# Patient Record
Sex: MALE | Race: WHITE | NOT HISPANIC OR LATINO | Employment: FULL TIME | URBAN - METROPOLITAN AREA
[De-identification: names, ages, dates, MRNs, and addresses within clinical notes are randomized per-mention and may not be internally consistent; named-entity substitution may affect disease eponyms.]

---

## 2019-11-04 ENCOUNTER — TELEPHONE (OUTPATIENT)
Dept: FAMILY MEDICINE | Facility: CLINIC | Age: 39
End: 2019-11-04

## 2019-11-04 RX ORDER — VERAPAMIL HYDROCHLORIDE 240 MG/1
240 CAPSULE, EXTENDED RELEASE ORAL DAILY
Qty: 90 CAPSULE | Refills: 1 | Status: SHIPPED | OUTPATIENT
Start: 2019-11-04 | End: 2020-04-09 | Stop reason: SDUPTHER

## 2019-11-04 NOTE — TELEPHONE ENCOUNTER
----- Message from Stormy Martinez sent at 11/4/2019  3:16 PM CST -----  Pt needs a RX  For Verapamil  [address] [facility] in [place]

## 2019-12-03 NOTE — TELEPHONE ENCOUNTER
----- Message from Justina Zepeda sent at 12/3/2019  3:42 PM CST -----  lexapro   Pharm [facility]   Pt [phone]

## 2019-12-04 ENCOUNTER — TELEPHONE (OUTPATIENT)
Dept: FAMILY MEDICINE | Facility: CLINIC | Age: 39
End: 2019-12-04

## 2019-12-04 RX ORDER — ESCITALOPRAM OXALATE 10 MG/1
10 TABLET ORAL DAILY
Qty: 90 TABLET | Refills: 1 | Status: SHIPPED | OUTPATIENT
Start: 2019-12-04 | End: 2020-07-14 | Stop reason: SDUPTHER

## 2019-12-04 NOTE — TELEPHONE ENCOUNTER
----- Message from Justina Zepeda sent at 12/3/2019  3:42 PM CST -----  lexapro   Pharm [facility]   Pt [phone]

## 2020-04-09 RX ORDER — VERAPAMIL HYDROCHLORIDE 240 MG/1
240 CAPSULE, EXTENDED RELEASE ORAL DAILY
Qty: 30 CAPSULE | Refills: 1 | Status: SHIPPED | OUTPATIENT
Start: 2020-04-09 | End: 2020-07-14 | Stop reason: SDUPTHER

## 2020-04-09 NOTE — TELEPHONE ENCOUNTER
----- Message from Justina Zepeda sent at 4/9/2020 11:41 AM CDT -----  Refills on bp med   Pharm [facility]   Pt [phone]

## 2020-06-23 ENCOUNTER — TELEPHONE (OUTPATIENT)
Dept: FAMILY MEDICINE | Facility: CLINIC | Age: 40
End: 2020-06-23

## 2020-07-14 ENCOUNTER — OFFICE VISIT (OUTPATIENT)
Dept: FAMILY MEDICINE | Facility: CLINIC | Age: 40
End: 2020-07-14
Payer: COMMERCIAL

## 2020-07-14 VITALS
SYSTOLIC BLOOD PRESSURE: 138 MMHG | DIASTOLIC BLOOD PRESSURE: 92 MMHG | TEMPERATURE: 98 F | WEIGHT: 199 LBS | HEART RATE: 72 BPM | BODY MASS INDEX: 28.55 KG/M2

## 2020-07-14 DIAGNOSIS — I10 ESSENTIAL HYPERTENSION: Primary | ICD-10-CM

## 2020-07-14 DIAGNOSIS — K21.9 GASTROESOPHAGEAL REFLUX DISEASE, ESOPHAGITIS PRESENCE NOT SPECIFIED: ICD-10-CM

## 2020-07-14 DIAGNOSIS — F41.9 ANXIETY: ICD-10-CM

## 2020-07-14 PROCEDURE — 99396 PREV VISIT EST AGE 40-64: CPT | Mod: S$GLB,,, | Performed by: PHYSICIAN ASSISTANT

## 2020-07-14 PROCEDURE — 99396 PR PREVENTIVE VISIT,EST,40-64: ICD-10-PCS | Mod: S$GLB,,, | Performed by: PHYSICIAN ASSISTANT

## 2020-07-14 PROCEDURE — 3008F BODY MASS INDEX DOCD: CPT | Mod: S$GLB,,, | Performed by: PHYSICIAN ASSISTANT

## 2020-07-14 PROCEDURE — 3008F PR BODY MASS INDEX (BMI) DOCUMENTED: ICD-10-PCS | Mod: S$GLB,,, | Performed by: PHYSICIAN ASSISTANT

## 2020-07-14 PROCEDURE — 3075F PR MOST RECENT SYSTOLIC BLOOD PRESS GE 130-139MM HG: ICD-10-PCS | Mod: S$GLB,,, | Performed by: PHYSICIAN ASSISTANT

## 2020-07-14 PROCEDURE — 3080F DIAST BP >= 90 MM HG: CPT | Mod: S$GLB,,, | Performed by: PHYSICIAN ASSISTANT

## 2020-07-14 PROCEDURE — 3075F SYST BP GE 130 - 139MM HG: CPT | Mod: S$GLB,,, | Performed by: PHYSICIAN ASSISTANT

## 2020-07-14 PROCEDURE — 3080F PR MOST RECENT DIASTOLIC BLOOD PRESSURE >= 90 MM HG: ICD-10-PCS | Mod: S$GLB,,, | Performed by: PHYSICIAN ASSISTANT

## 2020-07-14 RX ORDER — VERAPAMIL HYDROCHLORIDE 240 MG/1
240 CAPSULE, EXTENDED RELEASE ORAL DAILY
Qty: 90 CAPSULE | Refills: 1 | Status: SHIPPED | OUTPATIENT
Start: 2020-07-14 | End: 2021-01-28 | Stop reason: SDUPTHER

## 2020-07-14 RX ORDER — ESCITALOPRAM OXALATE 10 MG/1
10 TABLET ORAL DAILY
Qty: 90 TABLET | Refills: 1 | Status: SHIPPED | OUTPATIENT
Start: 2020-07-14 | End: 2022-03-31 | Stop reason: SDUPTHER

## 2020-07-14 RX ORDER — FAMOTIDINE 20 MG/1
20 TABLET, FILM COATED ORAL 2 TIMES DAILY
Qty: 180 TABLET | Refills: 1 | Status: SHIPPED | OUTPATIENT
Start: 2020-07-14 | End: 2022-03-31

## 2020-07-14 NOTE — PATIENT INSTRUCTIONS
Controlling High Blood Pressure  High blood pressure (hypertension) is often called the silent killer. This is because many people who have it dont know it. High blood pressure is defined as 140/90 mm Hg or higher. Know your blood pressure and remember to check it regularly. Doing so can save your life. Here are some things you can do to help control your blood pressure.    Choose heart-healthy foods  · Select low-salt, low-fat foods. Limit sodium intake to 2,400 mg per day or the amount suggested by your healthcare provider.  · Limit canned, dried, cured, packaged, and fast foods. These can contain a lot of salt.  · Eat 8 to 10 servings of fruits and vegetables every day.  · Choose lean meats, fish, or chicken.  · Eat whole-grain pasta, brown rice, and beans.  · Eat 2 to 3 servings of low-fat or fat-free dairy products.  · Ask your doctor about the DASH eating plan. This plan helps reduce blood pressure.  · When you go to a restaurant, ask that your meal be prepared with no added salt.  Maintain a healthy weight  · Ask your healthcare provider how many calories to eat a day. Then stick to that number.  · Ask your healthcare provider what weight range is healthiest for you. If you are overweight, a weight loss of only 3% to 5% of your body weight can help lower blood pressure. Generally, a good weight loss goal is to lose 10% of your body weight in a year.  · Limit snacks and sweets.  · Get regular exercise.  Get up and get active  · Choose activities you enjoy. Find ones you can do with friends or family. This includes bicycling, dancing, walking, and jogging.  · Park farther away from building entrances.  · Use stairs instead of the elevator.  · When you can, walk or bike instead of driving.  · [place] leaves, garden, or do household repairs.  · Be active at a moderate to vigorous level of physical activity for at least 40 minutes for a minimum of 3 to 4 days a week.   Manage stress  · Make time to relax and enjoy  life. Find time to laugh.  · Communicate your concerns with your loved ones and your healthcare provider.  · Visit with family and friends, and keep up with hobbies.  Limit alcohol and quit smoking  · Men should have no more than 2 drinks per day.  · Women should have no more than 1 drink per day.  · Talk with your healthcare provider about quitting smoking. Smoking significantly increases your risk for heart disease and stroke. Ask your healthcare provider about community smoking cessation programs and other options.  Medicines  If lifestyle changes arent enough, your healthcare provider may prescribe high blood pressure medicine. Take all medicines as prescribed. If you have any questions about your medicines, ask your healthcare provider before stopping or changing them.   Date Last Reviewed: 4/27/2016  © 7133-7942 [employer]. [address] [place] [address], [place] [address]. All rights reserved. This information is not intended as a substitute for professional medical care. Always follow your healthcare professional's instructions.

## 2020-07-14 NOTE — PROGRESS NOTES
SUBJECTIVE:    Patient ID: Kobe Phoenix is a 40 y.o. male.    Chief Complaint: Follow-up (no bottles, went over meds verbally// SW)    40-year-old male presents today for regular checkup.  Just managed for hypertension, GERD and anxiety. Denies any new concerns or complaints at this time. Reports that he has obtained a dental device to help with suspected ALVIN. From the yousuf data it appears that he has seen some improvement. Never had sleep srudy done. Pressure is slightly high today. Stressful afternoon.      No visits with results within 6 Month(s) from this visit.   Latest known visit with results is:   Hospital Outpatient Visit on 12/08/2016   Component Date Value Ref Range Status    WBC 12/08/2016 7.60  3.90 - 12.70 K/uL Final    RBC 12/08/2016 5.09  4.60 - 6.20 M/uL Final    Hemoglobin 12/08/2016 15.8  14.0 - 18.0 g/dL Final    Hematocrit 12/08/2016 45.7  40.0 - 54.0 % Final    Mean Corpuscular Volume 12/08/2016 90  82 - 98 fL Final    Mean Corpuscular Hemoglobin 12/08/2016 31.1* 27.0 - 31.0 pg Final    Mean Corpuscular Hemoglobin Conc 12/08/2016 34.6  32.0 - 36.0 % Final    RDW 12/08/2016 12.4  11.5 - 14.5 % Final    Platelets 12/08/2016 213  150 - 350 K/uL Final    MPV 12/08/2016 8.9* 9.2 - 12.9 fL Final    Gran # (ANC) 12/08/2016 3.9  1.8 - 7.7 K/uL Final    Lymph # 12/08/2016 2.4  1.0 - 4.8 K/uL Final    Mono # 12/08/2016 0.8  0.3 - 1.0 K/uL Final    Eos # 12/08/2016 0.5  0.0 - 0.5 K/uL Final    Baso # 12/08/2016 0.00  0.00 - 0.20 K/uL Final    Gran% 12/08/2016 51.2  38.0 - 73.0 % Final    Lymph% 12/08/2016 32.1  18.0 - 48.0 % Final    Mono% 12/08/2016 10.5  4.0 - 15.0 % Final    Eosinophil% 12/08/2016 5.9  0.0 - 8.0 % Final    Basophil% 12/08/2016 0.3  0.0 - 1.9 % Final    Differential Method 12/08/2016 Automated   Final    Sodium 12/08/2016 139  136 - 145 mmol/L Final    Potassium 12/08/2016 3.7  3.5 - 5.1 mmol/L Final    Chloride 12/08/2016 105  95 - 110 mmol/L Final     CO2 12/08/2016 24  23 - 29 mmol/L Final    Glucose 12/08/2016 107  70 - 110 mg/dL Final    BUN, Bld 12/08/2016 14  6 - 20 mg/dL Final    Creatinine 12/08/2016 1.3  0.5 - 1.4 mg/dL Final    Calcium 12/08/2016 8.9  8.7 - 10.5 mg/dL Final    Total Protein 12/08/2016 7.1  6.0 - 8.4 g/dL Final    Albumin 12/08/2016 4.1  3.5 - 5.2 g/dL Final    Total Bilirubin 12/08/2016 1.0  0.1 - 1.0 mg/dL Final    Alkaline Phosphatase 12/08/2016 60  55 - 135 U/L Final    AST 12/08/2016 25  10 - 40 U/L Final    ALT 12/08/2016 41  10 - 44 U/L Final    Anion Gap 12/08/2016 10  8 - 16 mmol/L Final    eGFR if African American 12/08/2016 >60  >60 mL/min/1.73 m^2 Final    eGFR if non African American 12/08/2016 >60  >60 mL/min/1.73 m^2 Final       Past Medical History:   Diagnosis Date    Acid reflux     Atrial fibrillation     High triglycerides     Hypertension      Past Surgical History:   Procedure Laterality Date    MASS EXCISION      mass excsion      VASECTOMY  12/2012     Family History   Problem Relation Age of Onset    Hypertension Mother     Hypertension Father     Cancer Maternal Grandmother 62        colon    Diabetes Maternal Grandmother     Heart disease Maternal Grandmother     Stroke Maternal Grandfather     Heart disease Maternal Grandfather        Marital Status:   Alcohol History:  reports current alcohol use.  Tobacco History:  reports that he has been smoking cigarettes. He has a 1.30 pack-year smoking history. He uses smokeless tobacco.  Drug History:  reports no history of drug use.    Review of patient's allergies indicates:   Allergen Reactions    Lisinopril Other (See Comments)     Angioedema       Current Outpatient Medications:     aspirin (ECOTRIN) 81 MG EC tablet, Take 1 tablet (81 mg total) by mouth once daily., Disp: 81 tablet, Rfl: mg    diphenhydrAMINE (BENADRYL ALLERGY) 25 mg tablet, Take 1 tablet (25 mg total) by mouth every 6 (six) hours as needed (Lip or tingue or  face swelling). Watch for sedation, Disp: , Rfl: 0    escitalopram oxalate (LEXAPRO) 10 MG tablet, Take 1 tablet (10 mg total) by mouth once daily., Disp: 90 tablet, Rfl: 1    famotidine (PEPCID) 20 MG tablet, Take 1 tablet (20 mg total) by mouth 2 (two) times daily., Disp: 180 tablet, Rfl: 1    verapamiL (VERELAN) 240 MG C24P, Take 1 capsule (240 mg total) by mouth once daily., Disp: 90 capsule, Rfl: 1    Review of Systems   Constitutional: Negative for activity change, fatigue, fever and unexpected weight change.   HENT: Negative for congestion.    Respiratory: Negative for apnea, cough, chest tightness and shortness of breath.    Cardiovascular: Negative for chest pain and palpitations.   Gastrointestinal: Negative for abdominal distention and abdominal pain.   Genitourinary: Negative for difficulty urinating and dysuria.   Musculoskeletal: Negative for arthralgias and back pain.   Neurological: Negative for dizziness and weakness.          Objective:      Vitals:    07/14/20 1551 07/14/20 1553 07/14/20 1613   BP: (!) 142/98 (!) 142/98 (!) 138/92   Pulse: 92  72   Temp: 98.2 °F (36.8 °C)     Weight: 90.3 kg (199 lb)       Physical Exam  Constitutional:       General: He is not in acute distress.     Appearance: He is well-developed.   HENT:      Head: Normocephalic and atraumatic.   Eyes:      Pupils: Pupils are equal, round, and reactive to light.   Neck:      Musculoskeletal: Normal range of motion and neck supple.      Thyroid: No thyromegaly.   Cardiovascular:      Rate and Rhythm: Normal rate and regular rhythm.      Heart sounds: Normal heart sounds.   Pulmonary:      Effort: Pulmonary effort is normal.      Breath sounds: Normal breath sounds.   Abdominal:      General: Bowel sounds are normal. There is no distension.      Palpations: Abdomen is soft.      Tenderness: There is no abdominal tenderness.   Musculoskeletal: Normal range of motion.   Skin:     General: Skin is warm and dry.      Findings: No  erythema or rash.   Neurological:      Mental Status: He is alert and oriented to person, place, and time.      Cranial Nerves: No cranial nerve deficit.           Assessment:       1. Essential hypertension    2. Anxiety    3. Gastroesophageal reflux disease, esophagitis presence not specified         Plan:       Essential hypertension  Comments:  BP slightly high in clinic. monitor at home and will get back to me with readings through [place]  Orders:  -     verapamiL (VERELAN) 240 MG C24P; Take 1 capsule (240 mg total) by mouth once daily.  Dispense: 90 capsule; Refill: 1  -     CBC auto differential; Future; Expected date: 07/14/2020  -     Comprehensive metabolic panel; Future; Expected date: 07/14/2020  -     Lipid Panel; Future; Expected date: 07/14/2020  -     TSH w/reflex to FT4; Future; Expected date: 07/14/2020  -     Urinalysis, Reflex to Urine Culture Urine, Clean Catch; Future; Expected date: 07/14/2020  -     Microalbumin/creatinine urine ratio; Future; Expected date: 07/14/2020    Anxiety  Comments:  mood appears stable at this time. continue as is.  Orders:  -     escitalopram oxalate (LEXAPRO) 10 MG tablet; Take 1 tablet (10 mg total) by mouth once daily.  Dispense: 90 tablet; Refill: 1    Gastroesophageal reflux disease, esophagitis presence not specified  -     famotidine (PEPCID) 20 MG tablet; Take 1 tablet (20 mg total) by mouth 2 (two) times daily.  Dispense: 180 tablet; Refill: 1      Follow up in about 6 months (around 1/14/2021) for BP Check-Up.        7/14/2020 Manolo Patel PA-C

## 2020-08-24 ENCOUNTER — TELEPHONE (OUTPATIENT)
Dept: FAMILY MEDICINE | Facility: CLINIC | Age: 40
End: 2020-08-24

## 2020-08-24 NOTE — TELEPHONE ENCOUNTER
It was not intentionally changed. We can give the pharmacy verbal or send in new rx for tablet form whichever is easier

## 2020-08-24 NOTE — TELEPHONE ENCOUNTER
----- Message from Stormy Martinez sent at 8/24/2020  2:07 PM CDT -----  Rx for verapamil capsules is not working as well as the tablets were. Pt does not know why the RX was changed from tablets to capsule? Please advise. Pt #[phone]

## 2020-08-27 DIAGNOSIS — I10 ESSENTIAL HYPERTENSION: Primary | ICD-10-CM

## 2020-08-27 RX ORDER — BENAZEPRIL HYDROCHLORIDE 10 MG/1
10 TABLET ORAL DAILY
Qty: 90 TABLET | Refills: 1 | Status: SHIPPED | OUTPATIENT
Start: 2020-08-27 | End: 2020-08-27

## 2020-08-27 RX ORDER — HYDROCHLOROTHIAZIDE 25 MG/1
25 TABLET ORAL DAILY
Qty: 90 TABLET | Refills: 1 | Status: SHIPPED | OUTPATIENT
Start: 2020-08-27 | End: 2021-02-17 | Stop reason: SDUPTHER

## 2020-08-27 NOTE — TELEPHONE ENCOUNTER
----- Message from Amberly Stevens sent at 8/27/2020 10:23 AM CDT -----  Pt calling said we sent a new BP medication in today but he has had issues taking Ace Inhibitors In the past and wants to know if this would have the same side effects. Said Lisinopril made his throat swell with a trip to the ER. CB # [phone]

## 2020-08-27 NOTE — TELEPHONE ENCOUNTER
Yes, I do see where he has an allergy to lisinopril.  I would not recommend another ACE-inhibitor in this case.  Let us try HCTZ 25 mg once daily.  If he agrees to this, then loaded for me to send.  Be sure to take benazepril off his list.

## 2020-08-27 NOTE — TELEPHONE ENCOUNTER
Lets add benazepril 10mg qd and have him continue to keep track for the next 2 weeks for us. If agreeable can load for me to send

## 2020-08-27 NOTE — TELEPHONE ENCOUNTER
----- Message from Ann Montes De Oca MA sent at 8/27/2020  8:44 AM CDT -----  Pt stated his bp is still running high after taking bp med verapamil 240 mg . Bp this morning 158/103.     cb # [phone]

## 2021-01-28 ENCOUNTER — TELEPHONE (OUTPATIENT)
Dept: FAMILY MEDICINE | Facility: CLINIC | Age: 41
End: 2021-01-28

## 2021-01-28 DIAGNOSIS — I10 ESSENTIAL HYPERTENSION: ICD-10-CM

## 2021-01-28 DIAGNOSIS — Z79.899 ENCOUNTER FOR LONG-TERM (CURRENT) USE OF OTHER MEDICATIONS: Primary | ICD-10-CM

## 2021-01-28 RX ORDER — VERAPAMIL HYDROCHLORIDE 240 MG/1
240 CAPSULE, EXTENDED RELEASE ORAL DAILY
Qty: 30 CAPSULE | Refills: 0 | Status: SHIPPED | OUTPATIENT
Start: 2021-01-28 | End: 2021-02-17 | Stop reason: SDUPTHER

## 2021-02-17 ENCOUNTER — OFFICE VISIT (OUTPATIENT)
Dept: FAMILY MEDICINE | Facility: CLINIC | Age: 41
End: 2021-02-17
Payer: COMMERCIAL

## 2021-02-17 VITALS
HEIGHT: 70 IN | BODY MASS INDEX: 29.06 KG/M2 | WEIGHT: 203 LBS | SYSTOLIC BLOOD PRESSURE: 122 MMHG | DIASTOLIC BLOOD PRESSURE: 92 MMHG | HEART RATE: 64 BPM

## 2021-02-17 DIAGNOSIS — M54.50 CHRONIC LOW BACK PAIN, UNSPECIFIED BACK PAIN LATERALITY, UNSPECIFIED WHETHER SCIATICA PRESENT: ICD-10-CM

## 2021-02-17 DIAGNOSIS — I10 ESSENTIAL HYPERTENSION: Primary | ICD-10-CM

## 2021-02-17 DIAGNOSIS — F41.9 ANXIETY: ICD-10-CM

## 2021-02-17 DIAGNOSIS — K21.9 GASTROESOPHAGEAL REFLUX DISEASE, UNSPECIFIED WHETHER ESOPHAGITIS PRESENT: ICD-10-CM

## 2021-02-17 DIAGNOSIS — G89.29 CHRONIC LOW BACK PAIN, UNSPECIFIED BACK PAIN LATERALITY, UNSPECIFIED WHETHER SCIATICA PRESENT: ICD-10-CM

## 2021-02-17 PROCEDURE — 99214 PR OFFICE/OUTPT VISIT, EST, LEVL IV, 30-39 MIN: ICD-10-PCS | Mod: S$GLB,,, | Performed by: PHYSICIAN ASSISTANT

## 2021-02-17 PROCEDURE — 3008F BODY MASS INDEX DOCD: CPT | Mod: S$GLB,,, | Performed by: PHYSICIAN ASSISTANT

## 2021-02-17 PROCEDURE — 3074F PR MOST RECENT SYSTOLIC BLOOD PRESSURE < 130 MM HG: ICD-10-PCS | Mod: S$GLB,,, | Performed by: PHYSICIAN ASSISTANT

## 2021-02-17 PROCEDURE — 99214 OFFICE O/P EST MOD 30 MIN: CPT | Mod: S$GLB,,, | Performed by: PHYSICIAN ASSISTANT

## 2021-02-17 PROCEDURE — 3080F PR MOST RECENT DIASTOLIC BLOOD PRESSURE >= 90 MM HG: ICD-10-PCS | Mod: S$GLB,,, | Performed by: PHYSICIAN ASSISTANT

## 2021-02-17 PROCEDURE — 3074F SYST BP LT 130 MM HG: CPT | Mod: S$GLB,,, | Performed by: PHYSICIAN ASSISTANT

## 2021-02-17 PROCEDURE — 3008F PR BODY MASS INDEX (BMI) DOCUMENTED: ICD-10-PCS | Mod: S$GLB,,, | Performed by: PHYSICIAN ASSISTANT

## 2021-02-17 PROCEDURE — 3080F DIAST BP >= 90 MM HG: CPT | Mod: S$GLB,,, | Performed by: PHYSICIAN ASSISTANT

## 2021-02-17 RX ORDER — VERAPAMIL HYDROCHLORIDE 240 MG/1
240 CAPSULE, EXTENDED RELEASE ORAL DAILY
Qty: 90 CAPSULE | Refills: 1 | Status: SHIPPED | OUTPATIENT
Start: 2021-02-17 | End: 2021-03-12 | Stop reason: SDUPTHER

## 2021-02-17 RX ORDER — CYCLOBENZAPRINE HCL 5 MG
5 TABLET ORAL 3 TIMES DAILY PRN
Qty: 30 TABLET | Refills: 2 | Status: SHIPPED | OUTPATIENT
Start: 2021-02-17 | End: 2021-03-19

## 2021-02-17 RX ORDER — HYDROCHLOROTHIAZIDE 25 MG/1
25 TABLET ORAL DAILY
Qty: 90 TABLET | Refills: 1 | Status: SHIPPED | OUTPATIENT
Start: 2021-02-17 | End: 2023-05-30

## 2021-03-01 ENCOUNTER — TELEPHONE (OUTPATIENT)
Dept: FAMILY MEDICINE | Facility: CLINIC | Age: 41
End: 2021-03-01

## 2021-03-03 ENCOUNTER — TELEPHONE (OUTPATIENT)
Dept: FAMILY MEDICINE | Facility: CLINIC | Age: 41
End: 2021-03-03

## 2021-03-12 DIAGNOSIS — I10 ESSENTIAL HYPERTENSION: ICD-10-CM

## 2021-03-12 RX ORDER — VERAPAMIL HYDROCHLORIDE 240 MG/1
240 CAPSULE, EXTENDED RELEASE ORAL DAILY
Qty: 90 CAPSULE | Refills: 1 | Status: SHIPPED | OUTPATIENT
Start: 2021-03-12 | End: 2021-03-30

## 2021-03-30 DIAGNOSIS — I10 ESSENTIAL HYPERTENSION: Primary | ICD-10-CM

## 2021-03-30 RX ORDER — VERAPAMIL HYDROCHLORIDE 240 MG/1
240 TABLET, FILM COATED, EXTENDED RELEASE ORAL NIGHTLY
Qty: 90 TABLET | Refills: 1 | Status: SHIPPED | OUTPATIENT
Start: 2021-03-30 | End: 2022-03-31

## 2021-03-30 RX ORDER — VERAPAMIL HYDROCHLORIDE 240 MG/1
240 TABLET, FILM COATED, EXTENDED RELEASE ORAL NIGHTLY
COMMUNITY
End: 2021-03-30

## 2021-04-20 ENCOUNTER — TELEPHONE (OUTPATIENT)
Dept: FAMILY MEDICINE | Facility: CLINIC | Age: 41
End: 2021-04-20

## 2021-04-20 ENCOUNTER — CLINICAL SUPPORT (OUTPATIENT)
Dept: FAMILY MEDICINE | Facility: CLINIC | Age: 41
End: 2021-04-20

## 2021-04-20 VITALS — DIASTOLIC BLOOD PRESSURE: 96 MMHG | SYSTOLIC BLOOD PRESSURE: 132 MMHG

## 2021-04-23 ENCOUNTER — TELEPHONE (OUTPATIENT)
Dept: FAMILY MEDICINE | Facility: CLINIC | Age: 41
End: 2021-04-23

## 2021-04-30 ENCOUNTER — CLINICAL SUPPORT (OUTPATIENT)
Dept: FAMILY MEDICINE | Facility: CLINIC | Age: 41
End: 2021-04-30

## 2021-04-30 VITALS — DIASTOLIC BLOOD PRESSURE: 86 MMHG | SYSTOLIC BLOOD PRESSURE: 128 MMHG

## 2021-04-30 RX ORDER — NEBIVOLOL 10 MG/1
10 TABLET ORAL DAILY
Qty: 30 TABLET | Refills: 11 | Status: SHIPPED | OUTPATIENT
Start: 2021-04-30 | End: 2022-03-31 | Stop reason: SDUPTHER

## 2021-05-01 LAB
ALBUMIN SERPL-MCNC: 4.4 G/DL (ref 3.6–5.1)
ALBUMIN/CREAT UR: 2 MCG/MG CREAT
ALBUMIN/GLOB SERPL: 1.8 (CALC) (ref 1–2.5)
ALP SERPL-CCNC: 43 U/L (ref 36–130)
ALT SERPL-CCNC: 18 U/L (ref 9–46)
APPEARANCE UR: CLEAR
AST SERPL-CCNC: 16 U/L (ref 10–40)
BACTERIA #/AREA URNS HPF: NORMAL /HPF
BACTERIA UR CULT: NORMAL
BASOPHILS # BLD AUTO: 20 CELLS/UL (ref 0–200)
BASOPHILS NFR BLD AUTO: 0.3 %
BILIRUB SERPL-MCNC: 1.5 MG/DL (ref 0.2–1.2)
BILIRUB UR QL STRIP: NEGATIVE
BUN SERPL-MCNC: 20 MG/DL (ref 7–25)
BUN/CREAT SERPL: ABNORMAL (CALC) (ref 6–22)
CALCIUM SERPL-MCNC: 9.5 MG/DL (ref 8.6–10.3)
CHLORIDE SERPL-SCNC: 104 MMOL/L (ref 98–110)
CHOLEST SERPL-MCNC: 218 MG/DL
CHOLEST/HDLC SERPL: 5.3 (CALC)
CO2 SERPL-SCNC: 27 MMOL/L (ref 20–32)
COLOR UR: YELLOW
CREAT SERPL-MCNC: 1.25 MG/DL (ref 0.6–1.35)
CREAT UR-MCNC: 177 MG/DL (ref 20–320)
EOSINOPHIL # BLD AUTO: 325 CELLS/UL (ref 15–500)
EOSINOPHIL NFR BLD AUTO: 5 %
ERYTHROCYTE [DISTWIDTH] IN BLOOD BY AUTOMATED COUNT: 13 % (ref 11–15)
GLOBULIN SER CALC-MCNC: 2.5 G/DL (CALC) (ref 1.9–3.7)
GLUCOSE SERPL-MCNC: 90 MG/DL (ref 65–99)
GLUCOSE UR QL STRIP: NEGATIVE
HCT VFR BLD AUTO: 46.6 % (ref 38.5–50)
HDLC SERPL-MCNC: 41 MG/DL
HGB BLD-MCNC: 15.9 G/DL (ref 13.2–17.1)
HGB UR QL STRIP: NEGATIVE
HYALINE CASTS #/AREA URNS LPF: NORMAL /LPF
KETONES UR QL STRIP: NEGATIVE
LDLC SERPL CALC-MCNC: 146 MG/DL (CALC)
LEUKOCYTE ESTERASE UR QL STRIP: NEGATIVE
LYMPHOCYTES # BLD AUTO: 2048 CELLS/UL (ref 850–3900)
LYMPHOCYTES NFR BLD AUTO: 31.5 %
MCH RBC QN AUTO: 31.8 PG (ref 27–33)
MCHC RBC AUTO-ENTMCNC: 34.1 G/DL (ref 32–36)
MCV RBC AUTO: 93.2 FL (ref 80–100)
MICROALBUMIN UR-MCNC: 0.4 MG/DL
MONOCYTES # BLD AUTO: 540 CELLS/UL (ref 200–950)
MONOCYTES NFR BLD AUTO: 8.3 %
NEUTROPHILS # BLD AUTO: 3569 CELLS/UL (ref 1500–7800)
NEUTROPHILS NFR BLD AUTO: 54.9 %
NITRITE UR QL STRIP: NEGATIVE
NONHDLC SERPL-MCNC: 177 MG/DL (CALC)
PH UR STRIP: 6 [PH] (ref 5–8)
PLATELET # BLD AUTO: 205 THOUSAND/UL (ref 140–400)
PMV BLD REES-ECKER: 10.9 FL (ref 7.5–12.5)
POTASSIUM SERPL-SCNC: 4.6 MMOL/L (ref 3.5–5.3)
PROT SERPL-MCNC: 6.9 G/DL (ref 6.1–8.1)
PROT UR QL STRIP: NEGATIVE
RBC # BLD AUTO: 5 MILLION/UL (ref 4.2–5.8)
RBC #/AREA URNS HPF: NORMAL /HPF
SODIUM SERPL-SCNC: 140 MMOL/L (ref 135–146)
SP GR UR STRIP: 1.02 (ref 1–1.03)
SQUAMOUS #/AREA URNS HPF: NORMAL /HPF
TRIGL SERPL-MCNC: 173 MG/DL
TSH SERPL-ACNC: 1.33 MIU/L (ref 0.4–4.5)
WBC # BLD AUTO: 6.5 THOUSAND/UL (ref 3.8–10.8)
WBC #/AREA URNS HPF: NORMAL /HPF

## 2022-02-22 ENCOUNTER — TELEPHONE (OUTPATIENT)
Dept: FAMILY MEDICINE | Facility: CLINIC | Age: 42
End: 2022-02-22
Payer: COMMERCIAL

## 2022-02-22 NOTE — TELEPHONE ENCOUNTER
Informed pt selene does not work on Wednesdays. Offered pt sooner appt than 3/31/22. Pt declines states he has a dentist appt tomorrow, he was going to try to get all the appointments done in one day. Pt states he will keep appt on 3/31/22.

## 2022-02-22 NOTE — TELEPHONE ENCOUNTER
----- Message from Stormy Martinez sent at 2/22/2022  8:58 AM CST -----  Pt wanted to be seen tomorrow for his annual visit if that is possible. Please advise. Pt #[phone]

## 2022-03-14 ENCOUNTER — TELEPHONE (OUTPATIENT)
Dept: FAMILY MEDICINE | Facility: CLINIC | Age: 42
End: 2022-03-14
Payer: COMMERCIAL

## 2022-03-14 DIAGNOSIS — Z79.899 ENCOUNTER FOR LONG-TERM (CURRENT) USE OF OTHER MEDICATIONS: Primary | ICD-10-CM

## 2022-03-14 DIAGNOSIS — Z00.00 ROUTINE GENERAL MEDICAL EXAMINATION AT A HEALTH CARE FACILITY: ICD-10-CM

## 2022-03-14 DIAGNOSIS — I10 ESSENTIAL HYPERTENSION: ICD-10-CM

## 2022-03-14 DIAGNOSIS — E78.1 HYPERTRIGLYCERIDEMIA: ICD-10-CM

## 2022-03-31 ENCOUNTER — OFFICE VISIT (OUTPATIENT)
Dept: FAMILY MEDICINE | Facility: CLINIC | Age: 42
End: 2022-03-31
Payer: COMMERCIAL

## 2022-03-31 VITALS
HEIGHT: 70 IN | SYSTOLIC BLOOD PRESSURE: 114 MMHG | HEART RATE: 66 BPM | WEIGHT: 205 LBS | BODY MASS INDEX: 29.35 KG/M2 | DIASTOLIC BLOOD PRESSURE: 76 MMHG | OXYGEN SATURATION: 96 %

## 2022-03-31 DIAGNOSIS — I48.91 ATRIAL FIBRILLATION, UNSPECIFIED TYPE: ICD-10-CM

## 2022-03-31 DIAGNOSIS — Z00.00 ROUTINE PHYSICAL EXAMINATION: ICD-10-CM

## 2022-03-31 DIAGNOSIS — F41.9 ANXIETY: ICD-10-CM

## 2022-03-31 DIAGNOSIS — Z11.3 SCREENING FOR STD (SEXUALLY TRANSMITTED DISEASE): Primary | ICD-10-CM

## 2022-03-31 PROCEDURE — 3078F DIAST BP <80 MM HG: CPT | Mod: S$GLB,,, | Performed by: PHYSICIAN ASSISTANT

## 2022-03-31 PROCEDURE — 99396 PR PREVENTIVE VISIT,EST,40-64: ICD-10-PCS | Mod: S$GLB,,, | Performed by: PHYSICIAN ASSISTANT

## 2022-03-31 PROCEDURE — 99396 PREV VISIT EST AGE 40-64: CPT | Mod: S$GLB,,, | Performed by: PHYSICIAN ASSISTANT

## 2022-03-31 PROCEDURE — 3008F BODY MASS INDEX DOCD: CPT | Mod: S$GLB,,, | Performed by: PHYSICIAN ASSISTANT

## 2022-03-31 PROCEDURE — 3074F SYST BP LT 130 MM HG: CPT | Mod: S$GLB,,, | Performed by: PHYSICIAN ASSISTANT

## 2022-03-31 PROCEDURE — 3008F PR BODY MASS INDEX (BMI) DOCUMENTED: ICD-10-PCS | Mod: S$GLB,,, | Performed by: PHYSICIAN ASSISTANT

## 2022-03-31 PROCEDURE — 3078F PR MOST RECENT DIASTOLIC BLOOD PRESSURE < 80 MM HG: ICD-10-PCS | Mod: S$GLB,,, | Performed by: PHYSICIAN ASSISTANT

## 2022-03-31 PROCEDURE — 3074F PR MOST RECENT SYSTOLIC BLOOD PRESSURE < 130 MM HG: ICD-10-PCS | Mod: S$GLB,,, | Performed by: PHYSICIAN ASSISTANT

## 2022-03-31 RX ORDER — NEBIVOLOL 10 MG/1
10 TABLET ORAL DAILY
Qty: 90 TABLET | Refills: 3 | Status: SHIPPED | OUTPATIENT
Start: 2022-03-31 | End: 2022-05-04 | Stop reason: SDUPTHER

## 2022-03-31 RX ORDER — ESCITALOPRAM OXALATE 10 MG/1
10 TABLET ORAL DAILY
Qty: 90 TABLET | Refills: 1 | Status: SHIPPED | OUTPATIENT
Start: 2022-03-31 | End: 2023-05-30

## 2022-03-31 NOTE — PROGRESS NOTES
SUBJECTIVE:    Patient ID: Kobe Phoenix is a 42 y.o. male.    Chief Complaint: Annual Exam (Annual wellness exam//no med bottles//declined flu vac//tc)    Very pleasant 42-year-old male presents today for annual wellness check.  He is treated for hypertension, hyperlipidemia, history of AFib and anxiety/depression.  He sees Cardiology but has not been seen in a few years. Has not had any major back issues or joint problems. No feelings of afib or palpitations recently. 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 Sustained an injury to the scrotum. Very painful at the time but only occasional discomfort now. Does not feel any lumps or masses on self exam. Wishes to let me know if pain persists and will pursue further imaging.      No visits with results within 6 Month(s) from this visit.   Latest known visit with results is:   Refill on 01/28/2021   Component Date Value Ref Range Status    Creatinine, Urine 04/30/2021 177  20 - 320 mg/dL Final    Microalb, Ur 04/30/2021 0.4  See Note: mg/dL Final    Microalb/Creat Ratio 04/30/2021 2  <30 mcg/mg creat Final    Color, UA 04/30/2021 YELLOW  YELLOW Final    Appearance, UA 04/30/2021 CLEAR  CLEAR Final    Specific Gravity, UA 04/30/2021 1.022  1.001 - 1.035 Final    pH, UA 04/30/2021 6.0  5.0 - 8.0 Final    Glucose, UA 04/30/2021 NEGATIVE  NEGATIVE Final    Bilirubin, UA 04/30/2021 NEGATIVE  NEGATIVE Final    Ketones, UA 04/30/2021 NEGATIVE  NEGATIVE Final    Occult Blood UA 04/30/2021 NEGATIVE  NEGATIVE Final    Protein, UA 04/30/2021 NEGATIVE  NEGATIVE Final    Nitrite, UA 04/30/2021 NEGATIVE  NEGATIVE Final    Leukocytes, UA 04/30/2021 NEGATIVE  NEGATIVE Final    WBC Casts, UA 04/30/2021 NONE SEEN  < OR = 5 /HPF Final    RBC Casts, UA 04/30/2021 NONE SEEN  < OR = 2 /HPF Final    Squam Epithel, UA 04/30/2021 NONE SEEN  < OR = 5 /HPF Final    Bacteria, UA 04/30/2021 NONE SEEN  NONE SEEN /HPF Final    Hyaline Casts, UA 04/30/2021 NONE SEEN  NONE SEEN /LPF  Final    Reflexive Urine Culture 04/30/2021    Final    TSH 04/30/2021 1.33  0.40 - 4.50 mIU/L Final    Cholesterol 04/30/2021 218 (A) <200 mg/dL Final    HDL 04/30/2021 41  > OR = 40 mg/dL Final    Triglycerides 04/30/2021 173 (A) <150 mg/dL Final    LDL Cholesterol 04/30/2021 146 (A) mg/dL (calc) Final    HDL/Cholesterol Ratio 04/30/2021 5.3 (A) <5.0 (calc) Final    Non HDL Chol. (LDL+VLDL) 04/30/2021 177 (A) <130 mg/dL (calc) Final    Glucose 04/30/2021 90  65 - 99 mg/dL Final    BUN 04/30/2021 20  7 - 25 mg/dL Final    Creatinine 04/30/2021 1.25  0.60 - 1.35 mg/dL Final    eGFR if non  04/30/2021 71  > OR = 60 mL/min/1.73m2 Final    eGFR if  04/30/2021 82  > OR = 60 mL/min/1.73m2 Final    BUN/Creatinine Ratio 04/30/2021 NOT APPLICABLE  6 - 22 (calc) Final    Sodium 04/30/2021 140  135 - 146 mmol/L Final    Potassium 04/30/2021 4.6  3.5 - 5.3 mmol/L Final    Chloride 04/30/2021 104  98 - 110 mmol/L Final    CO2 04/30/2021 27  20 - 32 mmol/L Final    Calcium 04/30/2021 9.5  8.6 - 10.3 mg/dL Final    Total Protein 04/30/2021 6.9  6.1 - 8.1 g/dL Final    Albumin 04/30/2021 4.4  3.6 - 5.1 g/dL Final    Globulin, Total 04/30/2021 2.5  1.9 - 3.7 g/dL (calc) Final    Albumin/Globulin Ratio 04/30/2021 1.8  1.0 - 2.5 (calc) Final    Total Bilirubin 04/30/2021 1.5 (A) 0.2 - 1.2 mg/dL Final    Alkaline Phosphatase 04/30/2021 43  36 - 130 U/L Final    AST 04/30/2021 16  10 - 40 U/L Final    ALT 04/30/2021 18  9 - 46 U/L Final    WBC 04/30/2021 6.5  3.8 - 10.8 Thousand/uL Final    RBC 04/30/2021 5.00  4.20 - 5.80 Million/uL Final    Hemoglobin 04/30/2021 15.9  13.2 - 17.1 g/dL Final    Hematocrit 04/30/2021 46.6  38.5 - 50.0 % Final    MCV 04/30/2021 93.2  80.0 - 100.0 fL Final    MCH 04/30/2021 31.8  27.0 - 33.0 pg Final    MCHC 04/30/2021 34.1  32.0 - 36.0 g/dL Final    RDW 04/30/2021 13.0  11.0 - 15.0 % Final    Platelets 04/30/2021 205  140 - 400  Thousand/uL Final    MPV 04/30/2021 10.9  7.5 - 12.5 fL Final    Neutrophils, Abs 04/30/2021 3,569  1,500 - 7,800 cells/uL Final    Lymph # 04/30/2021 2,048  850 - 3,900 cells/uL Final    Mono # 04/30/2021 540  200 - 950 cells/uL Final    Eos # 04/30/2021 325  15 - 500 cells/uL Final    Baso # 04/30/2021 20  0 - 200 cells/uL Final    Neutrophils Relative 04/30/2021 54.9  % Final    Lymph % 04/30/2021 31.5  % Final    Mono % 04/30/2021 8.3  % Final    Eosinophil % 04/30/2021 5.0  % Final    Basophil % 04/30/2021 0.3  % Final       Past Medical History:   Diagnosis Date    Acid reflux     Atrial fibrillation     High triglycerides     Hypertension      Past Surgical History:   Procedure Laterality Date    MASS EXCISION      mass excsion      VASECTOMY  12/2012     Family History   Problem Relation Age of Onset    Hypertension Mother     Hypertension Father     Cancer Maternal Grandmother 62        colon    Diabetes Maternal Grandmother     Heart disease Maternal Grandmother     Stroke Maternal Grandfather     Heart disease Maternal Grandfather        Marital Status:   Alcohol History:  reports current alcohol use.  Tobacco History:  reports that he has been smoking cigarettes. He has a 1.30 pack-year smoking history. He uses smokeless tobacco.  Drug History:  reports no history of drug use.    Review of patient's allergies indicates:   Allergen Reactions    Lisinopril Other (See Comments)     Angioedema       Current Outpatient Medications:     aspirin (ECOTRIN) 81 MG EC tablet, Take 1 tablet (81 mg total) by mouth once daily., Disp: 81 tablet, Rfl: mg    diphenhydrAMINE (BENADRYL ALLERGY) 25 mg tablet, Take 1 tablet (25 mg total) by mouth every 6 (six) hours as needed (Lip or tingue or face swelling). Watch for sedation, Disp: , Rfl: 0    EScitalopram oxalate (LEXAPRO) 10 MG tablet, Take 1 tablet (10 mg total) by mouth once daily., Disp: 90 tablet, Rfl: 1    hydroCHLOROthiazide  "(HYDRODIURIL) 25 MG tablet, Take 1 tablet (25 mg total) by mouth once daily., Disp: 90 tablet, Rfl: 1    nebivoloL (BYSTOLIC) 10 MG Tab, Take 1 tablet (10 mg total) by mouth once daily., Disp: 90 tablet, Rfl: 3    Review of Systems   Constitutional: Negative for activity change, fatigue, fever and unexpected weight change.   HENT: Negative for congestion.    Respiratory: Negative for apnea, cough, chest tightness and shortness of breath.    Cardiovascular: Negative for chest pain and palpitations.   Gastrointestinal: Negative for abdominal distention and abdominal pain.   Genitourinary: Negative for difficulty urinating and dysuria.   Musculoskeletal: Negative for arthralgias and back pain.   Neurological: Negative for dizziness and weakness.          Objective:      Vitals:    03/31/22 0741   BP: 114/76   Pulse: 66   SpO2: 96%   Weight: 93 kg (205 lb)   Height: 5' 10" (1.778 m)     Physical Exam  Constitutional:       General: He is not in acute distress.     Appearance: He is well-developed.   HENT:      Head: Normocephalic and atraumatic.   Eyes:      Pupils: Pupils are equal, round, and reactive to light.   Neck:      Thyroid: No thyromegaly.   Cardiovascular:      Rate and Rhythm: Normal rate and regular rhythm.      Heart sounds: Normal heart sounds.   Pulmonary:      Effort: Pulmonary effort is normal.      Breath sounds: Normal breath sounds.   Abdominal:      General: Bowel sounds are normal. There is no distension.      Palpations: Abdomen is soft.      Tenderness: There is no abdominal tenderness.   Musculoskeletal:         General: Normal range of motion.      Cervical back: Normal range of motion and neck supple.   Skin:     General: Skin is warm and dry.      Findings: No erythema or rash.   Neurological:      Mental Status: He is alert and oriented to person, place, and time.      Cranial Nerves: No cranial nerve deficit.           Assessment:       1. Screening for STD (sexually transmitted disease) "    2. Anxiety    3. Atrial fibrillation, unspecified type    4. Routine physical examination         Plan:       Screening for STD (sexually transmitted disease)  Comments:  Patient wishes for full STD testing.  Orders:  -     HIV 1/2 Ag/Ab (4th Gen); Future; Expected date: 03/31/2022  -     Hepatitis C Antibody; Future; Expected date: 03/31/2022  -     SureSwab(R)Chlamydia/N.Gonorrhoeae RNA,TMA  -     RPR; Future; Expected date: 03/31/2022    Anxiety  Comments:  mood appears stable at this time. continue as is.  Orders:  -     EScitalopram oxalate (LEXAPRO) 10 MG tablet; Take 1 tablet (10 mg total) by mouth once daily.  Dispense: 90 tablet; Refill: 1    Atrial fibrillation, unspecified type  Comments:  No recent episodes.  Patient has continued to see if Cardiology periodically.  Not on anticoagulation.  In normal sinus rhythm today  Orders:  -     nebivoloL (BYSTOLIC) 10 MG Tab; Take 1 tablet (10 mg total) by mouth once daily.  Dispense: 90 tablet; Refill: 3    Routine physical examination  Comments:  Very healthy overall. Pt will complete labs for me when fasting.      Follow up in about 6 months (around 9/30/2022).        3/31/2022 Manolo Patel PA-C

## 2022-05-04 ENCOUNTER — TELEPHONE (OUTPATIENT)
Dept: FAMILY MEDICINE | Facility: CLINIC | Age: 42
End: 2022-05-04

## 2022-05-04 DIAGNOSIS — I48.91 ATRIAL FIBRILLATION, UNSPECIFIED TYPE: ICD-10-CM

## 2022-05-04 RX ORDER — NEBIVOLOL 10 MG/1
10 TABLET ORAL DAILY
Qty: 90 TABLET | Refills: 3 | Status: SHIPPED | OUTPATIENT
Start: 2022-05-04 | End: 2023-04-11 | Stop reason: SDUPTHER

## 2022-05-04 NOTE — TELEPHONE ENCOUNTER
----- Message from Amberly Stevens sent at 5/4/2022 10:35 AM CDT -----  Pt calling for refill on Bystolic sent to [facility] in [place].  # [phone]

## 2022-07-13 LAB
ALBUMIN SERPL-MCNC: 4.2 G/DL (ref 3.6–5.1)
ALBUMIN/CREAT UR: 4 MCG/MG CREAT
ALBUMIN/GLOB SERPL: 1.9 (CALC) (ref 1–2.5)
ALP SERPL-CCNC: 55 U/L (ref 36–130)
ALT SERPL-CCNC: 31 U/L (ref 9–46)
APPEARANCE UR: CLEAR
AST SERPL-CCNC: 24 U/L (ref 10–40)
BACTERIA #/AREA URNS HPF: NORMAL /HPF
BACTERIA UR CULT: NORMAL
BASOPHILS # BLD AUTO: 11 CELLS/UL (ref 0–200)
BASOPHILS NFR BLD AUTO: 0.2 %
BILIRUB SERPL-MCNC: 0.7 MG/DL (ref 0.2–1.2)
BILIRUB UR QL STRIP: NEGATIVE
BUN SERPL-MCNC: 14 MG/DL (ref 7–25)
BUN/CREAT SERPL: ABNORMAL (CALC) (ref 6–22)
CALCIUM SERPL-MCNC: 8.9 MG/DL (ref 8.6–10.3)
CHLORIDE SERPL-SCNC: 107 MMOL/L (ref 98–110)
CHOLEST SERPL-MCNC: 173 MG/DL
CHOLEST/HDLC SERPL: 5.1 (CALC)
CO2 SERPL-SCNC: 27 MMOL/L (ref 20–32)
COLOR UR: YELLOW
CREAT SERPL-MCNC: 1.22 MG/DL (ref 0.6–1.29)
CREAT UR-MCNC: 79 MG/DL (ref 20–320)
EGFR: 76 ML/MIN/1.73M2
EOSINOPHIL # BLD AUTO: 218 CELLS/UL (ref 15–500)
EOSINOPHIL NFR BLD AUTO: 3.9 %
ERYTHROCYTE [DISTWIDTH] IN BLOOD BY AUTOMATED COUNT: 12.2 % (ref 11–15)
GLOBULIN SER CALC-MCNC: 2.2 G/DL (CALC) (ref 1.9–3.7)
GLUCOSE SERPL-MCNC: 116 MG/DL (ref 65–99)
GLUCOSE UR QL STRIP: NEGATIVE
HCT VFR BLD AUTO: 46.9 % (ref 38.5–50)
HCV AB S/CO SERPL IA: 0.01
HCV AB SERPL QL IA: NORMAL
HDLC SERPL-MCNC: 34 MG/DL
HGB BLD-MCNC: 15.5 G/DL (ref 13.2–17.1)
HGB UR QL STRIP: NEGATIVE
HIV 1+2 AB+HIV1 P24 AG SERPL QL IA: NORMAL
HYALINE CASTS #/AREA URNS LPF: NORMAL /LPF
KETONES UR QL STRIP: NEGATIVE
LDLC SERPL CALC-MCNC: 113 MG/DL (CALC)
LEUKOCYTE ESTERASE UR QL STRIP: NEGATIVE
LYMPHOCYTES # BLD AUTO: 1770 CELLS/UL (ref 850–3900)
LYMPHOCYTES NFR BLD AUTO: 31.6 %
MCH RBC QN AUTO: 31.2 PG (ref 27–33)
MCHC RBC AUTO-ENTMCNC: 33 G/DL (ref 32–36)
MCV RBC AUTO: 94.4 FL (ref 80–100)
MICROALBUMIN UR-MCNC: 0.3 MG/DL
MONOCYTES # BLD AUTO: 594 CELLS/UL (ref 200–950)
MONOCYTES NFR BLD AUTO: 10.6 %
NEUTROPHILS # BLD AUTO: 3007 CELLS/UL (ref 1500–7800)
NEUTROPHILS NFR BLD AUTO: 53.7 %
NITRITE UR QL STRIP: NEGATIVE
NONHDLC SERPL-MCNC: 139 MG/DL (CALC)
PH UR STRIP: 5.5 [PH] (ref 5–8)
PLATELET # BLD AUTO: 182 THOUSAND/UL (ref 140–400)
PMV BLD REES-ECKER: 11.1 FL (ref 7.5–12.5)
POTASSIUM SERPL-SCNC: 3.8 MMOL/L (ref 3.5–5.3)
PROT SERPL-MCNC: 6.4 G/DL (ref 6.1–8.1)
PROT UR QL STRIP: NEGATIVE
RBC # BLD AUTO: 4.97 MILLION/UL (ref 4.2–5.8)
RBC #/AREA URNS HPF: NORMAL /HPF
SERVICE CMNT-IMP: NORMAL
SODIUM SERPL-SCNC: 140 MMOL/L (ref 135–146)
SP GR UR STRIP: 1.01 (ref 1–1.03)
SQUAMOUS #/AREA URNS HPF: NORMAL /HPF
TRIGL SERPL-MCNC: 148 MG/DL
TSH SERPL-ACNC: 2.88 MIU/L (ref 0.4–4.5)
WBC # BLD AUTO: 5.6 THOUSAND/UL (ref 3.8–10.8)
WBC #/AREA URNS HPF: NORMAL /HPF

## 2022-07-20 ENCOUNTER — PATIENT MESSAGE (OUTPATIENT)
Dept: FAMILY MEDICINE | Facility: CLINIC | Age: 42
End: 2022-07-20

## 2023-04-06 DIAGNOSIS — I48.91 ATRIAL FIBRILLATION, UNSPECIFIED TYPE: ICD-10-CM

## 2023-04-06 RX ORDER — NEBIVOLOL 10 MG/1
10 TABLET ORAL DAILY
Qty: 90 TABLET | Refills: 3 | Status: CANCELLED | OUTPATIENT
Start: 2023-04-06 | End: 2024-04-05

## 2023-04-11 DIAGNOSIS — I48.91 ATRIAL FIBRILLATION, UNSPECIFIED TYPE: ICD-10-CM

## 2023-04-11 RX ORDER — NEBIVOLOL 10 MG/1
10 TABLET ORAL DAILY
Qty: 90 TABLET | Refills: 0 | Status: SHIPPED | OUTPATIENT
Start: 2023-04-11 | End: 2023-05-30 | Stop reason: SDUPTHER

## 2023-04-11 NOTE — TELEPHONE ENCOUNTER
----- Message from Livia Moy MA sent at 4/11/2023  8:19 AM CDT -----    ----- Message -----  From: Stormy Martinez  Sent: 4/11/2023   8:18 AM CDT  To: [facility] Staff    Refill for Bystolic. Pt is completely out. Pt has an appt for 5/26 but needs enough medication sent in until then. [facility] in [place]. Pt #[phone]

## 2023-05-12 ENCOUNTER — TELEPHONE (OUTPATIENT)
Dept: FAMILY MEDICINE | Facility: CLINIC | Age: 43
End: 2023-05-12

## 2023-05-12 DIAGNOSIS — E78.1 HYPERTRIGLYCERIDEMIA: ICD-10-CM

## 2023-05-12 DIAGNOSIS — Z79.899 ENCOUNTER FOR LONG-TERM (CURRENT) USE OF OTHER MEDICATIONS: Primary | ICD-10-CM

## 2023-05-12 DIAGNOSIS — I10 ESSENTIAL HYPERTENSION: ICD-10-CM

## 2023-05-25 ENCOUNTER — TELEPHONE (OUTPATIENT)
Dept: FAMILY MEDICINE | Facility: CLINIC | Age: 43
End: 2023-05-25

## 2023-05-25 NOTE — TELEPHONE ENCOUNTER
----- Message from Analia Cuadra sent at 5/25/2023  9:25 AM CDT -----  Pt would like to reschedule his appt tomorrow for next week if possible. If not he will keep tomorrow   [phone]

## 2023-05-30 ENCOUNTER — OFFICE VISIT (OUTPATIENT)
Dept: FAMILY MEDICINE | Facility: CLINIC | Age: 43
End: 2023-05-30
Payer: COMMERCIAL

## 2023-05-30 VITALS
DIASTOLIC BLOOD PRESSURE: 80 MMHG | BODY MASS INDEX: 28.63 KG/M2 | SYSTOLIC BLOOD PRESSURE: 110 MMHG | HEIGHT: 70 IN | WEIGHT: 200 LBS | HEART RATE: 68 BPM

## 2023-05-30 DIAGNOSIS — F41.9 ANXIETY: ICD-10-CM

## 2023-05-30 DIAGNOSIS — I10 ESSENTIAL HYPERTENSION: ICD-10-CM

## 2023-05-30 DIAGNOSIS — I48.91 ATRIAL FIBRILLATION, UNSPECIFIED TYPE: ICD-10-CM

## 2023-05-30 PROCEDURE — 1159F MED LIST DOCD IN RCRD: CPT | Mod: CPTII,S$GLB,, | Performed by: PHYSICIAN ASSISTANT

## 2023-05-30 PROCEDURE — 3074F SYST BP LT 130 MM HG: CPT | Mod: CPTII,S$GLB,, | Performed by: PHYSICIAN ASSISTANT

## 2023-05-30 PROCEDURE — 3074F PR MOST RECENT SYSTOLIC BLOOD PRESSURE < 130 MM HG: ICD-10-PCS | Mod: CPTII,S$GLB,, | Performed by: PHYSICIAN ASSISTANT

## 2023-05-30 PROCEDURE — 99396 PREV VISIT EST AGE 40-64: CPT | Mod: S$GLB,,, | Performed by: PHYSICIAN ASSISTANT

## 2023-05-30 PROCEDURE — 3079F DIAST BP 80-89 MM HG: CPT | Mod: CPTII,S$GLB,, | Performed by: PHYSICIAN ASSISTANT

## 2023-05-30 PROCEDURE — 1159F PR MEDICATION LIST DOCUMENTED IN MEDICAL RECORD: ICD-10-PCS | Mod: CPTII,S$GLB,, | Performed by: PHYSICIAN ASSISTANT

## 2023-05-30 PROCEDURE — 3079F PR MOST RECENT DIASTOLIC BLOOD PRESSURE 80-89 MM HG: ICD-10-PCS | Mod: CPTII,S$GLB,, | Performed by: PHYSICIAN ASSISTANT

## 2023-05-30 PROCEDURE — 3008F PR BODY MASS INDEX (BMI) DOCUMENTED: ICD-10-PCS | Mod: CPTII,S$GLB,, | Performed by: PHYSICIAN ASSISTANT

## 2023-05-30 PROCEDURE — 3008F BODY MASS INDEX DOCD: CPT | Mod: CPTII,S$GLB,, | Performed by: PHYSICIAN ASSISTANT

## 2023-05-30 PROCEDURE — 99396 PR PREVENTIVE VISIT,EST,40-64: ICD-10-PCS | Mod: S$GLB,,, | Performed by: PHYSICIAN ASSISTANT

## 2023-05-30 RX ORDER — ESCITALOPRAM OXALATE 10 MG/1
10 TABLET ORAL DAILY
Qty: 90 TABLET | Refills: 3 | Status: CANCELLED | OUTPATIENT
Start: 2023-05-30 | End: 2024-05-24

## 2023-05-30 RX ORDER — HYDROCHLOROTHIAZIDE 25 MG/1
25 TABLET ORAL DAILY
Qty: 90 TABLET | Refills: 3 | Status: CANCELLED | OUTPATIENT
Start: 2023-05-30

## 2023-05-30 RX ORDER — NEBIVOLOL 10 MG/1
10 TABLET ORAL DAILY
Qty: 90 TABLET | Refills: 3 | Status: SHIPPED | OUTPATIENT
Start: 2023-05-30 | End: 2024-05-29

## 2023-05-30 NOTE — PROGRESS NOTES
SUBJECTIVE:    Patient ID: Kobe Phoenix is a 43 y.o. male.    Chief Complaint: Annual Exam (Went over meds verbally// SW)    This is a 43-year-old male who presents today for annual exam.  History of GERD, AFib and hypertension. He is very active in the gym. No new concerns at this time. He is aware anytime he goes into a-fib. Never lasts any extended period of time. Dr. High, cards. Just wanted him on asa. Low risk of stroke if afib does not persist more than 24-36 hours.. Will plan to get labs for me asap. No longer requiring lexapro for mood/anxiety. No issues with GERD since he has really changed his diet.      No visits with results within 6 Month(s) from this visit.   Latest known visit with results is:   Telephone on 03/14/2022   Component Date Value Ref Range Status    WBC 07/12/2022 5.6  3.8 - 10.8 Thousand/uL Final    RBC 07/12/2022 4.97  4.20 - 5.80 Million/uL Final    Hemoglobin 07/12/2022 15.5  13.2 - 17.1 g/dL Final    Hematocrit 07/12/2022 46.9  38.5 - 50.0 % Final    MCV 07/12/2022 94.4  80.0 - 100.0 fL Final    MCH 07/12/2022 31.2  27.0 - 33.0 pg Final    MCHC 07/12/2022 33.0  32.0 - 36.0 g/dL Final    RDW 07/12/2022 12.2  11.0 - 15.0 % Final    Platelets 07/12/2022 182  140 - 400 Thousand/uL Final    MPV 07/12/2022 11.1  7.5 - 12.5 fL Final    Neutrophils, Abs 07/12/2022 3,007  1,500 - 7,800 cells/uL Final    Lymph # 07/12/2022 1,770  850 - 3,900 cells/uL Final    Mono # 07/12/2022 594  200 - 950 cells/uL Final    Eos # 07/12/2022 218  15 - 500 cells/uL Final    Baso # 07/12/2022 11  0 - 200 cells/uL Final    Neutrophils Relative 07/12/2022 53.7  % Final    Lymph % 07/12/2022 31.6  % Final    Mono % 07/12/2022 10.6  % Final    Eosinophil % 07/12/2022 3.9  % Final    Basophil % 07/12/2022 0.2  % Final    Glucose 07/12/2022 116 (H)  65 - 99 mg/dL Final    BUN 07/12/2022 14  7 - 25 mg/dL Final    Creatinine 07/12/2022 1.22  0.60 - 1.29 mg/dL Final    eGFR 07/12/2022 76  > OR = 60  mL/min/1.73m2 Final    BUN/Creatinine Ratio 07/12/2022 NOT APPLICABLE  6 - 22 (calc) Final    Sodium 07/12/2022 140  135 - 146 mmol/L Final    Potassium 07/12/2022 3.8  3.5 - 5.3 mmol/L Final    Chloride 07/12/2022 107  98 - 110 mmol/L Final    CO2 07/12/2022 27  20 - 32 mmol/L Final    Calcium 07/12/2022 8.9  8.6 - 10.3 mg/dL Final    Total Protein 07/12/2022 6.4  6.1 - 8.1 g/dL Final    Albumin 07/12/2022 4.2  3.6 - 5.1 g/dL Final    Globulin, Total 07/12/2022 2.2  1.9 - 3.7 g/dL (calc) Final    Albumin/Globulin Ratio 07/12/2022 1.9  1.0 - 2.5 (calc) Final    Total Bilirubin 07/12/2022 0.7  0.2 - 1.2 mg/dL Final    Alkaline Phosphatase 07/12/2022 55  36 - 130 U/L Final    AST 07/12/2022 24  10 - 40 U/L Final    ALT 07/12/2022 31  9 - 46 U/L Final    Cholesterol 07/12/2022 173  <200 mg/dL Final    HDL 07/12/2022 34 (L)  > OR = 40 mg/dL Final    Triglycerides 07/12/2022 148  <150 mg/dL Final    LDL Cholesterol 07/12/2022 113 (H)  mg/dL (calc) Final    HDL/Cholesterol Ratio 07/12/2022 5.1 (H)  <5.0 (calc) Final    Non HDL Chol. (LDL+VLDL) 07/12/2022 139 (H)  <130 mg/dL (calc) Final    Creatinine, Urine 07/12/2022 79  20 - 320 mg/dL Final    Microalb, Ur 07/12/2022 0.3  See Note: mg/dL Final    Microalb/Creat Ratio 07/12/2022 4  <30 mcg/mg creat Final    TSH w/reflex to FT4 07/12/2022 2.88  0.40 - 4.50 mIU/L Final    Color, UA 07/12/2022 YELLOW  YELLOW Final    Appearance, UA 07/12/2022 CLEAR  CLEAR Final    Specific Gravity, UA 07/12/2022 1.009  1.001 - 1.035 Final    pH, UA 07/12/2022 5.5  5.0 - 8.0 Final    Glucose, UA 07/12/2022 NEGATIVE  NEGATIVE Final    Bilirubin, UA 07/12/2022 NEGATIVE  NEGATIVE Final    Ketones, UA 07/12/2022 NEGATIVE  NEGATIVE Final    Occult Blood UA 07/12/2022 NEGATIVE  NEGATIVE Final    Protein, UA 07/12/2022 NEGATIVE  NEGATIVE Final    Nitrite, UA 07/12/2022 NEGATIVE  NEGATIVE Final    Leukocytes, UA 07/12/2022 NEGATIVE  NEGATIVE Final    WBC Casts, UA 07/12/2022 0-5  < OR = 5 /HPF Final     RBC Casts, UA 07/12/2022 NONE SEEN  < OR = 2 /HPF Final    Squam Epithel, UA 07/12/2022 NONE SEEN  < OR = 5 /HPF Final    Bacteria, UA 07/12/2022 NONE SEEN  NONE SEEN /HPF Final    Hyaline Casts, UA 07/12/2022 NONE SEEN  NONE SEEN /LPF Final    Service Cmt: 07/12/2022    Final    Reflexive Urine Culture 07/12/2022    Final    Hepatitis C Ab 07/12/2022 NON-REACTIVE  NON-REACTIVE Final    Signal/Cutoff 07/12/2022 0.01  <1.00 Final    HIV Ag/Ab 4th Gen 07/12/2022 NON-REACTIVE  NON-REACTIVE Final       Past Medical History:   Diagnosis Date    Acid reflux     Atrial fibrillation     High triglycerides     Hypertension      Past Surgical History:   Procedure Laterality Date    MASS EXCISION      mass excsion      VASECTOMY  12/2012     Family History   Problem Relation Age of Onset    Hypertension Mother     Hypertension Father     Cancer Maternal Grandmother 62        colon    Diabetes Maternal Grandmother     Heart disease Maternal Grandmother     Stroke Maternal Grandfather     Heart disease Maternal Grandfather        Marital Status:   Alcohol History:  reports current alcohol use.  Tobacco History:  reports that he has been smoking cigarettes. He has a 1.30 pack-year smoking history. He has been exposed to tobacco smoke. He uses smokeless tobacco.  Drug History:  reports no history of drug use.    Review of patient's allergies indicates:   Allergen Reactions    Lisinopril Other (See Comments)     Angioedema       Current Outpatient Medications:     aspirin (ECOTRIN) 81 MG EC tablet, Take 1 tablet (81 mg total) by mouth once daily., Disp: 81 tablet, Rfl: mg    diphenhydrAMINE (BENADRYL ALLERGY) 25 mg tablet, Take 1 tablet (25 mg total) by mouth every 6 (six) hours as needed (Lip or tingue or face swelling). Watch for sedation, Disp: , Rfl: 0    nebivoloL (BYSTOLIC) 10 MG Tab, Take 1 tablet (10 mg total) by mouth once daily., Disp: 90 tablet, Rfl: 3    Review of Systems   Constitutional:  Negative for  "activity change, fatigue, fever and unexpected weight change.   HENT:  Negative for congestion.    Respiratory:  Negative for apnea, cough, chest tightness and shortness of breath.    Cardiovascular:  Negative for chest pain and palpitations.   Gastrointestinal:  Negative for abdominal distention and abdominal pain.   Genitourinary:  Negative for difficulty urinating and dysuria.   Musculoskeletal:  Negative for arthralgias and back pain.   Neurological:  Negative for dizziness and weakness.        Objective:      Vitals:    05/30/23 0756   BP: 110/80   Pulse: 68   Weight: 90.7 kg (200 lb)   Height: 5' 10" (1.778 m)     Physical Exam  Constitutional:       General: He is not in acute distress.     Appearance: He is well-developed.   HENT:      Head: Normocephalic and atraumatic.   Eyes:      Pupils: Pupils are equal, round, and reactive to light.   Neck:      Thyroid: No thyromegaly.   Cardiovascular:      Rate and Rhythm: Normal rate and regular rhythm.      Heart sounds: Normal heart sounds.   Pulmonary:      Effort: Pulmonary effort is normal.      Breath sounds: Normal breath sounds.   Abdominal:      General: Bowel sounds are normal. There is no distension.      Palpations: Abdomen is soft.      Tenderness: There is no abdominal tenderness.   Musculoskeletal:         General: Normal range of motion.      Cervical back: Normal range of motion and neck supple.   Skin:     General: Skin is warm and dry.      Findings: No erythema or rash.   Neurological:      Mental Status: He is alert and oriented to person, place, and time.      Cranial Nerves: No cranial nerve deficit.         Assessment:       1. Anxiety    2. Essential hypertension    3. Atrial fibrillation, unspecified type         Plan:       Anxiety  Comments:  mood appears stable at this time. continue as is.    Essential hypertension  Comments:  BP today: 110/80  Currently managed on bystolic    Atrial fibrillation, unspecified type  Comments:  No recent " episodes.  Patient has continued to see if Cardiology periodically.  Not on anticoagulation.  In normal sinus rhythm today  Orders:  -     nebivoloL (BYSTOLIC) 10 MG Tab; Take 1 tablet (10 mg total) by mouth once daily.  Dispense: 90 tablet; Refill: 3      Follow up in about 1 year (around 5/30/2024) for Annual Physical.        5/30/2023 Manolo Patel PA-C

## 2024-06-11 ENCOUNTER — TELEPHONE (OUTPATIENT)
Dept: FAMILY MEDICINE | Facility: CLINIC | Age: 44
End: 2024-06-11
Payer: COMMERCIAL

## 2024-06-11 NOTE — TELEPHONE ENCOUNTER
----- Message from tSormy Martinez sent at 6/11/2024 12:46 PM CDT -----  Pt needs an annual appt . Pt #[phone]

## 2024-07-01 ENCOUNTER — TELEPHONE (OUTPATIENT)
Dept: FAMILY MEDICINE | Facility: CLINIC | Age: 44
End: 2024-07-01
Payer: COMMERCIAL

## 2024-07-01 DIAGNOSIS — I10 ESSENTIAL HYPERTENSION: ICD-10-CM

## 2024-07-01 DIAGNOSIS — Z79.899 ENCOUNTER FOR LONG-TERM (CURRENT) USE OF OTHER MEDICATIONS: Primary | ICD-10-CM

## 2024-07-01 DIAGNOSIS — E78.1 HYPERTRIGLYCERIDEMIA: ICD-10-CM

## 2024-07-15 ENCOUNTER — OFFICE VISIT (OUTPATIENT)
Dept: FAMILY MEDICINE | Facility: CLINIC | Age: 44
End: 2024-07-15
Payer: COMMERCIAL

## 2024-07-15 VITALS
SYSTOLIC BLOOD PRESSURE: 118 MMHG | WEIGHT: 212 LBS | BODY MASS INDEX: 30.35 KG/M2 | HEART RATE: 62 BPM | DIASTOLIC BLOOD PRESSURE: 63 MMHG | RESPIRATION RATE: 18 BRPM | HEIGHT: 70 IN | OXYGEN SATURATION: 97 %

## 2024-07-15 DIAGNOSIS — M51.36 DDD (DEGENERATIVE DISC DISEASE), LUMBAR: ICD-10-CM

## 2024-07-15 DIAGNOSIS — I48.91 ATRIAL FIBRILLATION, UNSPECIFIED TYPE: ICD-10-CM

## 2024-07-15 DIAGNOSIS — Z00.00 ROUTINE PHYSICAL EXAMINATION: Primary | ICD-10-CM

## 2024-07-15 PROCEDURE — 3078F DIAST BP <80 MM HG: CPT | Mod: CPTII,S$GLB,, | Performed by: PHYSICIAN ASSISTANT

## 2024-07-15 PROCEDURE — 99396 PREV VISIT EST AGE 40-64: CPT | Mod: S$GLB,,, | Performed by: PHYSICIAN ASSISTANT

## 2024-07-15 PROCEDURE — 1159F MED LIST DOCD IN RCRD: CPT | Mod: CPTII,S$GLB,, | Performed by: PHYSICIAN ASSISTANT

## 2024-07-15 PROCEDURE — 3008F BODY MASS INDEX DOCD: CPT | Mod: CPTII,S$GLB,, | Performed by: PHYSICIAN ASSISTANT

## 2024-07-15 PROCEDURE — 3074F SYST BP LT 130 MM HG: CPT | Mod: CPTII,S$GLB,, | Performed by: PHYSICIAN ASSISTANT

## 2024-07-15 RX ORDER — CYCLOBENZAPRINE HCL 5 MG
5 TABLET ORAL 3 TIMES DAILY PRN
Qty: 45 TABLET | Refills: 2 | Status: SHIPPED | OUTPATIENT
Start: 2024-07-15 | End: 2024-10-13

## 2024-07-15 RX ORDER — NEBIVOLOL 10 MG/1
10 TABLET ORAL DAILY
Qty: 90 TABLET | Refills: 3 | Status: SHIPPED | OUTPATIENT
Start: 2024-07-15 | End: 2025-07-15

## 2024-07-15 RX ORDER — CYCLOBENZAPRINE HCL 5 MG
5 TABLET ORAL 3 TIMES DAILY PRN
COMMUNITY
End: 2024-07-15 | Stop reason: SDUPTHER

## 2024-07-15 NOTE — PROGRESS NOTES
SUBJECTIVE:    Patient ID: Kobe Phoenix is a 44 y.o. male.    Chief Complaint: Annual Exam (Annual visit // no bottles// refills needed// pt states on and off his feet feels like they're sleep while driving going on for about 1 year )    45 yo male who presents for annual checkup. Reports that he has been doing pretty well overall. Big concern is numbness occasionally all over with rest. Discussed potential for workup with PM&R if persiting        No visits with results within 6 Month(s) from this visit.   Latest known visit with results is:   Telephone on 03/14/2022   Component Date Value Ref Range Status    WBC 07/12/2022 5.6  3.8 - 10.8 Thousand/uL Final    RBC 07/12/2022 4.97  4.20 - 5.80 Million/uL Final    Hemoglobin 07/12/2022 15.5  13.2 - 17.1 g/dL Final    Hematocrit 07/12/2022 46.9  38.5 - 50.0 % Final    MCV 07/12/2022 94.4  80.0 - 100.0 fL Final    MCH 07/12/2022 31.2  27.0 - 33.0 pg Final    MCHC 07/12/2022 33.0  32.0 - 36.0 g/dL Final    RDW 07/12/2022 12.2  11.0 - 15.0 % Final    Platelets 07/12/2022 182  140 - 400 Thousand/uL Final    MPV 07/12/2022 11.1  7.5 - 12.5 fL Final    Neutrophils, Abs 07/12/2022 3,007  1,500 - 7,800 cells/uL Final    Lymph # 07/12/2022 1,770  850 - 3,900 cells/uL Final    Mono # 07/12/2022 594  200 - 950 cells/uL Final    Eos # 07/12/2022 218  15 - 500 cells/uL Final    Baso # 07/12/2022 11  0 - 200 cells/uL Final    Neutrophils Relative 07/12/2022 53.7  % Final    Lymph % 07/12/2022 31.6  % Final    Mono % 07/12/2022 10.6  % Final    Eosinophil % 07/12/2022 3.9  % Final    Basophil % 07/12/2022 0.2  % Final    Glucose 07/12/2022 116 (H)  65 - 99 mg/dL Final    BUN 07/12/2022 14  7 - 25 mg/dL Final    Creatinine 07/12/2022 1.22  0.60 - 1.29 mg/dL Final    eGFR 07/12/2022 76  > OR = 60 mL/min/1.73m2 Final    BUN/Creatinine Ratio 07/12/2022 NOT APPLICABLE  6 - 22 (calc) Final    Sodium 07/12/2022 140  135 - 146 mmol/L Final    Potassium 07/12/2022 3.8  3.5 - 5.3  mmol/L Final    Chloride 07/12/2022 107  98 - 110 mmol/L Final    CO2 07/12/2022 27  20 - 32 mmol/L Final    Calcium 07/12/2022 8.9  8.6 - 10.3 mg/dL Final    Total Protein 07/12/2022 6.4  6.1 - 8.1 g/dL Final    Albumin 07/12/2022 4.2  3.6 - 5.1 g/dL Final    Globulin, Total 07/12/2022 2.2  1.9 - 3.7 g/dL (calc) Final    Albumin/Globulin Ratio 07/12/2022 1.9  1.0 - 2.5 (calc) Final    Total Bilirubin 07/12/2022 0.7  0.2 - 1.2 mg/dL Final    Alkaline Phosphatase 07/12/2022 55  36 - 130 U/L Final    AST 07/12/2022 24  10 - 40 U/L Final    ALT 07/12/2022 31  9 - 46 U/L Final    Cholesterol 07/12/2022 173  <200 mg/dL Final    HDL 07/12/2022 34 (L)  > OR = 40 mg/dL Final    Triglycerides 07/12/2022 148  <150 mg/dL Final    LDL Cholesterol 07/12/2022 113 (H)  mg/dL (calc) Final    HDL/Cholesterol Ratio 07/12/2022 5.1 (H)  <5.0 (calc) Final    Non HDL Chol. (LDL+VLDL) 07/12/2022 139 (H)  <130 mg/dL (calc) Final    Creatinine, Urine 07/12/2022 79  20 - 320 mg/dL Final    Microalb, Ur 07/12/2022 0.3  See Note: mg/dL Final    Microalb/Creat Ratio 07/12/2022 4  <30 mcg/mg creat Final    TSH w/reflex to FT4 07/12/2022 2.88  0.40 - 4.50 mIU/L Final    Color, UA 07/12/2022 YELLOW  YELLOW Final    Appearance, UA 07/12/2022 CLEAR  CLEAR Final    Specific Gravity, UA 07/12/2022 1.009  1.001 - 1.035 Final    pH, UA 07/12/2022 5.5  5.0 - 8.0 Final    Glucose, UA 07/12/2022 NEGATIVE  NEGATIVE Final    Bilirubin, UA 07/12/2022 NEGATIVE  NEGATIVE Final    Ketones, UA 07/12/2022 NEGATIVE  NEGATIVE Final    Occult Blood UA 07/12/2022 NEGATIVE  NEGATIVE Final    Protein, UA 07/12/2022 NEGATIVE  NEGATIVE Final    Nitrite, UA 07/12/2022 NEGATIVE  NEGATIVE Final    Leukocytes, UA 07/12/2022 NEGATIVE  NEGATIVE Final    WBC Casts, UA 07/12/2022 0-5  < OR = 5 /HPF Final    RBC Casts, UA 07/12/2022 NONE SEEN  < OR = 2 /HPF Final    Squam Epithel, UA 07/12/2022 NONE SEEN  < OR = 5 /HPF Final    Bacteria, UA 07/12/2022 NONE SEEN  NONE SEEN /HPF  Final    Hyaline Casts, UA 07/12/2022 NONE SEEN  NONE SEEN /LPF Final    Service Cmt: 07/12/2022    Final    Reflexive Urine Culture 07/12/2022    Final    Hepatitis C Ab 07/12/2022 NON-REACTIVE  NON-REACTIVE Final    Signal/Cutoff 07/12/2022 0.01  <1.00 Final    HIV Ag/Ab 4th Gen 07/12/2022 NON-REACTIVE  NON-REACTIVE Final       Past Medical History:   Diagnosis Date    Acid reflux     Atrial fibrillation     High triglycerides     Hypertension      Past Surgical History:   Procedure Laterality Date    MASS EXCISION      mass excsion      VASECTOMY  12/2012     Family History   Problem Relation Name Age of Onset    Hypertension Mother      Hypertension Father      Cancer Maternal Grandmother  62        colon    Diabetes Maternal Grandmother      Heart disease Maternal Grandmother      Stroke Maternal Grandfather      Heart disease Maternal Grandfather         Marital Status: Single  Alcohol History:  reports current alcohol use.  Tobacco History:  reports that he has been smoking cigarettes. He has a 1.3 pack-year smoking history. He has been exposed to tobacco smoke. He uses smokeless tobacco.  Drug History:  reports no history of drug use.    Review of patient's allergies indicates:   Allergen Reactions    Lisinopril Other (See Comments)     Angioedema       Current Outpatient Medications:     aspirin (ECOTRIN) 81 MG EC tablet, Take 1 tablet (81 mg total) by mouth once daily., Disp: 81 tablet, Rfl: mg    diphenhydrAMINE (BENADRYL ALLERGY) 25 mg tablet, Take 1 tablet (25 mg total) by mouth every 6 (six) hours as needed (Lip or tingue or face swelling). Watch for sedation, Disp: , Rfl: 0    cyclobenzaprine (FLEXERIL) 5 MG tablet, Take 1 tablet (5 mg total) by mouth 3 (three) times daily as needed for Muscle spasms., Disp: 45 tablet, Rfl: 2    nebivoloL (BYSTOLIC) 10 MG Tab, Take 1 tablet (10 mg total) by mouth once daily., Disp: 90 tablet, Rfl: 3    Review of Systems   Constitutional:  Negative for activity change  "and unexpected weight change.   HENT:  Negative for hearing loss, rhinorrhea and trouble swallowing.    Eyes:  Negative for discharge and visual disturbance.   Respiratory:  Negative for chest tightness and wheezing.    Cardiovascular:  Negative for chest pain and palpitations.   Gastrointestinal:  Negative for blood in stool, constipation, diarrhea and vomiting.   Endocrine: Negative for polydipsia and polyuria.   Genitourinary:  Negative for difficulty urinating, hematuria and urgency.   Musculoskeletal:  Negative for arthralgias, joint swelling and neck pain.   Neurological:  Negative for weakness and headaches.   Psychiatric/Behavioral:  Negative for confusion and dysphoric mood.           Objective:      Vitals:    07/15/24 1540   BP: 118/63   Pulse: 62   Resp: 18   SpO2: 97%   Weight: 96.2 kg (212 lb)   Height: 5' 10" (1.778 m)     Physical Exam  Constitutional:       General: He is not in acute distress.     Appearance: He is well-developed.   HENT:      Head: Normocephalic and atraumatic.   Eyes:      Pupils: Pupils are equal, round, and reactive to light.   Neck:      Thyroid: No thyromegaly.   Cardiovascular:      Rate and Rhythm: Normal rate and regular rhythm.      Heart sounds: Normal heart sounds.   Pulmonary:      Effort: Pulmonary effort is normal.      Breath sounds: Normal breath sounds.   Abdominal:      General: Bowel sounds are normal. There is no distension.      Palpations: Abdomen is soft.      Tenderness: There is no abdominal tenderness.   Musculoskeletal:         General: Normal range of motion.      Cervical back: Normal range of motion and neck supple.   Skin:     General: Skin is warm and dry.      Findings: No erythema or rash.   Neurological:      Mental Status: He is alert and oriented to person, place, and time.      Cranial Nerves: No cranial nerve deficit.           Assessment:       1. Routine physical examination    2. Atrial fibrillation, unspecified type    3. DDD " (degenerative disc disease), lumbar         Plan:       Routine physical examination  Comments:  Labs today for ongoing pt care.    Atrial fibrillation, unspecified type  Comments:  No recent episodes.  Patient has continued to see if Cardiology periodically.  Not on anticoagulation.  In normal sinus rhythm today  Orders:  -     nebivoloL (BYSTOLIC) 10 MG Tab; Take 1 tablet (10 mg total) by mouth once daily.  Dispense: 90 tablet; Refill: 3    DDD (degenerative disc disease), lumbar  Comments:  will refill flexeril to use prn. consider further evaluation if numbness persists.  Orders:  -     cyclobenzaprine (FLEXERIL) 5 MG tablet; Take 1 tablet (5 mg total) by mouth 3 (three) times daily as needed for Muscle spasms.  Dispense: 45 tablet; Refill: 2      Follow up in about 1 year (around 7/15/2025) for Annual Physical.        7/15/2024 Manolo Patel PA-C

## 2024-08-01 ENCOUNTER — TELEPHONE (OUTPATIENT)
Dept: FAMILY MEDICINE | Facility: CLINIC | Age: 44
End: 2024-08-01
Payer: COMMERCIAL

## 2024-08-08 DIAGNOSIS — I48.91 ATRIAL FIBRILLATION, UNSPECIFIED TYPE: ICD-10-CM

## 2024-08-08 RX ORDER — NEBIVOLOL 10 MG/1
10 TABLET ORAL DAILY
Qty: 90 TABLET | Refills: 3 | Status: SHIPPED | OUTPATIENT
Start: 2024-08-08 | End: 2025-08-08

## 2024-08-29 DIAGNOSIS — M51.36 DDD (DEGENERATIVE DISC DISEASE), LUMBAR: ICD-10-CM

## 2024-08-30 RX ORDER — CYCLOBENZAPRINE HCL 5 MG
5 TABLET ORAL 3 TIMES DAILY PRN
Qty: 45 TABLET | Refills: 2 | Status: SHIPPED | OUTPATIENT
Start: 2024-08-30 | End: 2024-11-28

## 2024-11-12 ENCOUNTER — TELEPHONE (OUTPATIENT)
Dept: FAMILY MEDICINE | Facility: CLINIC | Age: 44
End: 2024-11-12
Payer: COMMERCIAL

## 2024-11-12 DIAGNOSIS — I10 ESSENTIAL HYPERTENSION: ICD-10-CM

## 2024-11-12 DIAGNOSIS — Z79.899 ENCOUNTER FOR LONG-TERM (CURRENT) USE OF MEDICATIONS: Primary | ICD-10-CM

## 2024-11-12 NOTE — TELEPHONE ENCOUNTER
----- Message from Tech Laura sent at 11/12/2024  8:30 AM CST -----  Regarding: FW: 3 month BMP repeat    ----- Message -----  From: Heidi Galicia LPN  Sent: 11/12/2024  12:00 AM CST  To: Manolo Patel Staff  Subject: 3 month BMP repeat                               Repeat BMP in 3 months. Pt will cut back on nsaids and increase fluids

## 2024-11-12 NOTE — TELEPHONE ENCOUNTER
Spoke to patient that fasting lab is due to recheck kidney function, order at [facility]. Said he will come Friday. Order pended. Updated remind me.

## 2024-11-16 LAB
BUN SERPL-MCNC: 21 MG/DL (ref 7–25)
BUN/CREAT SERPL: ABNORMAL (CALC) (ref 6–22)
CALCIUM SERPL-MCNC: 9.2 MG/DL (ref 8.6–10.3)
CHLORIDE SERPL-SCNC: 105 MMOL/L (ref 98–110)
CO2 SERPL-SCNC: 25 MMOL/L (ref 20–32)
CREAT SERPL-MCNC: 1.16 MG/DL (ref 0.6–1.29)
EGFR: 80 ML/MIN/1.73M2
GLUCOSE SERPL-MCNC: 116 MG/DL (ref 65–99)
POTASSIUM SERPL-SCNC: 4.2 MMOL/L (ref 3.5–5.3)
SODIUM SERPL-SCNC: 140 MMOL/L (ref 135–146)

## 2025-07-17 ENCOUNTER — OFFICE VISIT (OUTPATIENT)
Dept: FAMILY MEDICINE | Facility: CLINIC | Age: 45
End: 2025-07-17
Payer: COMMERCIAL

## 2025-07-17 VITALS
RESPIRATION RATE: 18 BRPM | WEIGHT: 215 LBS | OXYGEN SATURATION: 98 % | SYSTOLIC BLOOD PRESSURE: 122 MMHG | HEIGHT: 70 IN | HEART RATE: 68 BPM | BODY MASS INDEX: 30.78 KG/M2 | DIASTOLIC BLOOD PRESSURE: 74 MMHG

## 2025-07-17 DIAGNOSIS — I48.91 ATRIAL FIBRILLATION, UNSPECIFIED TYPE: ICD-10-CM

## 2025-07-17 DIAGNOSIS — Z00.00 ROUTINE PHYSICAL EXAMINATION: ICD-10-CM

## 2025-07-17 DIAGNOSIS — Z12.11 SCREENING FOR COLON CANCER: ICD-10-CM

## 2025-07-17 DIAGNOSIS — I10 ESSENTIAL HYPERTENSION: Primary | ICD-10-CM

## 2025-07-17 RX ORDER — NEBIVOLOL 10 MG/1
10 TABLET ORAL DAILY
Qty: 90 TABLET | Refills: 3 | Status: SHIPPED | OUTPATIENT
Start: 2025-07-17 | End: 2026-07-17

## 2025-07-17 NOTE — PROGRESS NOTES
SUBJECTIVE:    Patient ID: Kobe Phoenix is a 45 y.o. male.    Chief Complaint: Annual Exam (Annual visit//no bottles//no refills needed// pt states he have no complaints today //pt agrees to colon cancer screening prefers cologuard )    History of Present Illness    CHIEF COMPLAINT:  Kobe presents today for annual follow-up.    CURRENT ILLNESS:  He reports sinus infection that started Thursday evening with symptoms including sneezing, runny nose, and watery eyes. He developed thick green nasal discharge with increasing congestion and progression of symptoms since onset. He is currently taking antibiotics and steroids for the infection.    TESTOSTERONE REPLACEMENT THERAPY:  He recently initiated Testosterone Replacement Therapy (TRT) at local clinic after initial testosterone level of 490, which was considered clinically low. He reports positive effects including improved energy, mental clarity, reduced brain fog, enhanced joint comfort, and increased libido. He undergoes laboratory monitoring every three months and reports feeling good with current treatment and dosage.    MEDICAL HISTORY:  He has a history of acute kidney injury last summer, potentially related to dehydration with elevated creatinine levels during the episode.    FAMILY HISTORY:  His grandmother had colon cancer in her late 60s with possible concurrent diabetes diagnosis. No other reported family history of colon cancer or significant polyp development in immediate family members.    CURRENT MEDICATIONS:  He takes nebivolol 5 mg for blood pressure management.      ROS:  General: -fever, -chills, -fatigue, -weight gain, -weight loss, +increased energy levels  Eyes: -vision changes, -redness, -discharge, +eye watering  ENT: -ear pain, +nasal congestion, -sore throat, +sinus pressure, +frequent sneezing, +runny nose  Cardiovascular: -chest pain, -palpitations, -lower extremity edema  Respiratory: -cough, -shortness of  breath  Gastrointestinal: -abdominal pain, -nausea, -vomiting, -diarrhea, -constipation, -blood in stool  Genitourinary: -dysuria, -hematuria, -frequency  Musculoskeletal: -joint pain, -muscle pain  Skin: -rash, -lesion  Neurological: -headache, -dizziness, -numbness, -tingling  Psychiatric: -anxiety, -depression, -sleep difficulty         No visits with results within 6 Month(s) from this visit.   Latest known visit with results is:   Telephone on 11/12/2024   Component Date Value Ref Range Status    Glucose 11/15/2024 116 (H)  65 - 99 mg/dL Final    BUN 11/15/2024 21  7 - 25 mg/dL Final    Creatinine 11/15/2024 1.16  0.60 - 1.29 mg/dL Final    eGFR 11/15/2024 80  > OR = 60 mL/min/1.73m2 Final    BUN/Creatinine Ratio 11/15/2024 SEE NOTE:  6 - 22 (calc) Final    Sodium 11/15/2024 140  135 - 146 mmol/L Final    Potassium 11/15/2024 4.2  3.5 - 5.3 mmol/L Final    Chloride 11/15/2024 105  98 - 110 mmol/L Final    CO2 11/15/2024 25  20 - 32 mmol/L Final    Calcium 11/15/2024 9.2  8.6 - 10.3 mg/dL Final       Past Medical History:   Diagnosis Date    Acid reflux     Atrial fibrillation     High triglycerides     Hypertension      Past Surgical History:   Procedure Laterality Date    MASS EXCISION      mass excsion      VASECTOMY  12/2012     Family History   Problem Relation Name Age of Onset    Hypertension Mother      Hypertension Father      Cancer Maternal Grandmother  62        colon    Diabetes Maternal Grandmother      Heart disease Maternal Grandmother      Stroke Maternal Grandfather      Heart disease Maternal Grandfather         Marital Status:   Alcohol History:  reports current alcohol use.  Tobacco History:  reports that he has been smoking cigarettes. He has a 1.3 pack-year smoking history. He has been exposed to tobacco smoke. He uses smokeless tobacco.  Drug History:  reports no history of drug use.    Review of patient's allergies indicates:   Allergen Reactions    Lisinopril Other (See Comments)  "    Angioedema     Current Medications[1]    Objective:      Vitals:    07/17/25 1532   BP: 122/74   Pulse: 68   Resp: 18   SpO2: 98%   Weight: 97.5 kg (215 lb)   Height: 5' 10" (1.778 m)     Physical Exam    General: No acute distress. Well-developed. Well-nourished.  Eyes: EOMI. Sclerae anicteric.  HENT: Normocephalic. Atraumatic. Nares patent. Moist oral mucosa.  Ears: Bilateral TMs clear. Bilateral EACs clear.  Cardiovascular: Regular rate. Regular rhythm. No murmurs. No rubs. No gallops. Normal S1, S2.  Respiratory: Normal respiratory effort. Clear to auscultation bilaterally. No rales. No rhonchi. No wheezing.  Abdomen: Soft. Non-tender. Non-distended. Normoactive bowel sounds.  Musculoskeletal: No  obvious deformity.  Extremities: No lower extremity edema.  Neurological: Alert & oriented x3. No slurred speech. Normal gait.  Psychiatric: Normal mood. Normal affect. Good insight. Good judgment.  Skin: Warm. Dry. No rash.         Assessment:       Assessment & Plan    - Reviewed testosterone replacement therapy (TRT) initiated at local clinic, noting good clinical response and appropriate monitoring.  - Evaluated options for colorectal cancer screening, recommending Cologuard as initial test given average risk profile.  - Confirmed up-to-date Tdap vaccination, noting that after initial dose, only TD booster is needed every 10 years for adults.    ACUTE SINUSITIS:  - Kobe reports congestion, sneezing, rhinorrhea, and watery eyes with symptoms starting Thursday night before camping.  - Noted thick and green nasal discharge.  - Kobe's girlfriend, an anesthesiologist, delivered antibiotics and a steroid for sinusitis, which have been prescribed for treatment.    TESTICULAR HYPOFUNCTION:  - Kobe reports positive effects with testosterone replacement therapy (TRT), including improved energy, mental clarity, joint pain, libido, and sex drive.  - His testosterone level was 490 ng/dL before starting TRT, and he " reports no complaints with current regimen.  - Will continue TRT prescribed by local clinic and monitor labs every 3 months to check for side effects such as increased hematocrit and prostate swelling.    ESSENTIAL HYPERTENSION:  - Continue nebivolol for blood pressure management with prescription refilled for a full year.    FAMILY HISTORY OF COLORECTAL CANCER:  - Kobe reports grandmother had colon cancer later in life, possibly in her late 60s.  - Explained Cologuard as a stool-based screening test for colorectal cancer, discussing its sensitivity and specificity compared to colonoscopy.  - Ordered Cologuard test due to average risk and family history.  - Will follow up after test completion.    FAMILY HISTORY OF DIABETES MELLITUS:  - Kobe reports grandmother had diabetes mellitus later in life, possibly in her late 60s.  - Ordered annual lab work, including HbA1C for diabetes screening.    FOLLOW-UP:  - Kobe had elevated creatinine levels last year, which were rechecked and normalized, possibly due to dehydration.  - Will follow up after completion of lab work within the next couple of weeks.       Plan:       Essential hypertension    Routine physical examination  -     CBC W/ AUTO DIFFERENTIAL; Future; Expected date: 07/17/2025  -     COMPREHENSIVE METABOLIC PANEL; Future; Expected date: 07/17/2025  -     Lipid Panel; Future; Expected date: 07/17/2025  -     TSH w/reflex to FT4; Future; Expected date: 07/17/2025  -     Urinalysis, Reflex to Urine Culture Urine, Clean Catch; Future; Expected date: 07/17/2025  -     HEMOGLOBIN A1C; Future; Expected date: 07/17/2025    Screening for colon cancer  -     Cologuard Screening (Multitarget Stool DNA); Future; Expected date: 07/17/2025    Atrial fibrillation, unspecified type  Comments:  No recent episodes.  Patient has continued to see if Cardiology periodically.  Not on anticoagulation.  In normal sinus rhythm today  Orders:  -     nebivoloL (BYSTOLIC) 10 MG Tab;  Take 1 tablet (10 mg total) by mouth once daily.  Dispense: 90 tablet; Refill: 3      Follow up in about 1 year (around 7/17/2026).    This note was generated with the assistance of ambient listening technology. Verbal consent was obtained by the patient and accompanying visitor(s) for the recording of patient appointment to facilitate this note. I attest to having reviewed and edited the generated note for accuracy, though some syntax or spelling errors may persist. Please contact the author of this note for any clarification.          7/17/2025 Manolo Patel PA-C      Answers submitted by the patient for this visit:  Review of Systems Questionnaire (Submitted on 7/14/2025)  activity change: No  unexpected weight change: No  neck pain: No  hearing loss: No  rhinorrhea: No  trouble swallowing: No  eye discharge: No  visual disturbance: No  chest tightness: No  wheezing: No  chest pain: No  palpitations: No  blood in stool: No  constipation: No  vomiting: No  diarrhea: No  polydipsia: No  polyuria: No  difficulty urinating: No  urgency: No  hematuria: No  joint swelling: No  arthralgias: No  headaches: No  weakness: No  confusion: No  dysphoric mood: No         [1]   Current Outpatient Medications:     aspirin (ECOTRIN) 81 MG EC tablet, Take 1 tablet (81 mg total) by mouth once daily., Disp: 81 tablet, Rfl: mg    nebivoloL (BYSTOLIC) 10 MG Tab, Take 1 tablet (10 mg total) by mouth once daily., Disp: 90 tablet, Rfl: 3

## 2025-09-01 ENCOUNTER — HOSPITAL ENCOUNTER (EMERGENCY)
Facility: HOSPITAL | Age: 45
Discharge: HOME | End: 2025-09-01
Attending: EMERGENCY MEDICINE
Payer: COMMERCIAL

## 2025-09-01 VITALS
HEART RATE: 76 BPM | DIASTOLIC BLOOD PRESSURE: 76 MMHG | RESPIRATION RATE: 17 BRPM | BODY MASS INDEX: 30.06 KG/M2 | HEIGHT: 70 IN | SYSTOLIC BLOOD PRESSURE: 122 MMHG | TEMPERATURE: 98 F | WEIGHT: 210 LBS | OXYGEN SATURATION: 95 %

## 2025-09-01 DIAGNOSIS — I48.0 PAROXYSMAL ATRIAL FIBRILLATION: Primary | ICD-10-CM

## 2025-09-01 LAB
ABSOLUTE EOSINOPHIL (SMH): 0.32 K/UL
ABSOLUTE MONOCYTE (SMH): 0.95 K/UL (ref 0.3–1)
ABSOLUTE NEUTROPHIL COUNT (SMH): 4.4 K/UL (ref 1.8–7.7)
ALBUMIN SERPL-MCNC: 4.3 G/DL (ref 3.5–5.2)
ALP SERPL-CCNC: 45 UNIT/L (ref 40–150)
ALT SERPL-CCNC: 40 UNIT/L (ref 10–44)
ANION GAP (SMH): 10 MMOL/L (ref 8–16)
AST SERPL-CCNC: 32 UNIT/L (ref 11–45)
BASOPHILS # BLD AUTO: 0.03 K/UL
BASOPHILS NFR BLD AUTO: 0.4 %
BILIRUB SERPL-MCNC: 1.1 MG/DL (ref 0.1–1)
BUN SERPL-MCNC: 16 MG/DL (ref 6–20)
CALCIUM SERPL-MCNC: 9.4 MG/DL (ref 8.7–10.5)
CHLORIDE SERPL-SCNC: 107 MMOL/L (ref 95–110)
CO2 SERPL-SCNC: 24 MMOL/L (ref 23–29)
CREAT SERPL-MCNC: 1.4 MG/DL (ref 0.5–1.4)
ERYTHROCYTE [DISTWIDTH] IN BLOOD BY AUTOMATED COUNT: 11.9 % (ref 11.5–14.5)
GFR SERPLBLD CREATININE-BSD FMLA CKD-EPI: >60 ML/MIN/1.73/M2
GLUCOSE SERPL-MCNC: 136 MG/DL (ref 70–110)
HCT VFR BLD AUTO: 54.5 % (ref 40–54)
HGB BLD-MCNC: 18.9 GM/DL (ref 14–18)
IMM GRANULOCYTES # BLD AUTO: 0.03 K/UL (ref 0–0.04)
IMM GRANULOCYTES NFR BLD AUTO: 0.4 % (ref 0–0.5)
LYMPHOCYTES # BLD AUTO: 2.79 K/UL (ref 1–4.8)
MAGNESIUM SERPL-MCNC: 2 MG/DL (ref 1.6–2.6)
MCH RBC QN AUTO: 31.6 PG (ref 27–31)
MCHC RBC AUTO-ENTMCNC: 34.7 G/DL (ref 32–36)
MCV RBC AUTO: 91 FL (ref 82–98)
NT-PROBNP SERPL-MCNC: 1524 PG/ML
NUCLEATED RBC (/100WBC) (SMH): 0 /100 WBC
OHS QRS DURATION: 78 MS
OHS QTC CALCULATION: 385 MS
PLATELET # BLD AUTO: 236 K/UL (ref 150–450)
PMV BLD AUTO: 10.7 FL (ref 9.2–12.9)
POTASSIUM SERPL-SCNC: 4.4 MMOL/L (ref 3.5–5.1)
PROT SERPL-MCNC: 7.2 GM/DL (ref 6–8.4)
RBC # BLD AUTO: 5.99 M/UL (ref 4.6–6.2)
RELATIVE EOSINOPHIL (SMH): 3.7 % (ref 0–8)
RELATIVE LYMPHOCYTE (SMH): 32.6 % (ref 18–48)
RELATIVE MONOCYTE (SMH): 11.1 % (ref 4–15)
RELATIVE NEUTROPHIL (SMH): 51.8 % (ref 38–73)
SODIUM SERPL-SCNC: 141 MMOL/L (ref 136–145)
TROPONIN I SERPL HS-MCNC: 5 NG/L
TSH SERPL-ACNC: 2.34 UIU/ML (ref 0.4–4)
WBC # BLD AUTO: 8.56 K/UL (ref 3.9–12.7)

## 2025-09-01 PROCEDURE — 85025 COMPLETE CBC W/AUTO DIFF WBC: CPT | Performed by: EMERGENCY MEDICINE

## 2025-09-01 PROCEDURE — 93005 ELECTROCARDIOGRAM TRACING: CPT

## 2025-09-01 PROCEDURE — 96365 THER/PROPH/DIAG IV INF INIT: CPT

## 2025-09-01 PROCEDURE — 25000003 PHARM REV CODE 250: Performed by: EMERGENCY MEDICINE

## 2025-09-01 PROCEDURE — 83880 ASSAY OF NATRIURETIC PEPTIDE: CPT | Performed by: EMERGENCY MEDICINE

## 2025-09-01 PROCEDURE — 63600175 PHARM REV CODE 636 W HCPCS: Performed by: EMERGENCY MEDICINE

## 2025-09-01 PROCEDURE — 84484 ASSAY OF TROPONIN QUANT: CPT | Performed by: EMERGENCY MEDICINE

## 2025-09-01 PROCEDURE — 96366 THER/PROPH/DIAG IV INF ADDON: CPT

## 2025-09-01 PROCEDURE — 83735 ASSAY OF MAGNESIUM: CPT | Performed by: EMERGENCY MEDICINE

## 2025-09-01 PROCEDURE — 80053 COMPREHEN METABOLIC PANEL: CPT | Performed by: EMERGENCY MEDICINE

## 2025-09-01 PROCEDURE — 99285 EMERGENCY DEPT VISIT HI MDM: CPT | Mod: 25

## 2025-09-01 PROCEDURE — 94760 N-INVAS EAR/PLS OXIMETRY 1: CPT

## 2025-09-01 PROCEDURE — 93010 ELECTROCARDIOGRAM REPORT: CPT | Mod: ,,, | Performed by: GENERAL PRACTICE

## 2025-09-01 PROCEDURE — 96361 HYDRATE IV INFUSION ADD-ON: CPT

## 2025-09-01 PROCEDURE — 84443 ASSAY THYROID STIM HORMONE: CPT | Performed by: EMERGENCY MEDICINE

## 2025-09-01 RX ORDER — MUPIROCIN 20 MG/G
1 OINTMENT TOPICAL 3 TIMES DAILY
COMMUNITY
Start: 2025-08-22

## 2025-09-01 RX ORDER — DOXYCYCLINE 100 MG/1
100 CAPSULE ORAL 2 TIMES DAILY
COMMUNITY
Start: 2025-08-19

## 2025-09-01 RX ORDER — MAGNESIUM SULFATE HEPTAHYDRATE 40 MG/ML
2 INJECTION, SOLUTION INTRAVENOUS ONCE
Status: COMPLETED | OUTPATIENT
Start: 2025-09-01 | End: 2025-09-01

## 2025-09-01 RX ORDER — ASPIRIN 325 MG
325 TABLET ORAL
Status: COMPLETED | OUTPATIENT
Start: 2025-09-01 | End: 2025-09-01

## 2025-09-01 RX ORDER — FLUCONAZOLE 100 MG/1
100 TABLET ORAL SEE ADMIN INSTRUCTIONS
COMMUNITY
Start: 2025-07-22

## 2025-09-01 RX ADMIN — ASPIRIN 325 MG: 325 TABLET ORAL at 09:09

## 2025-09-01 RX ADMIN — SODIUM CHLORIDE 1000 ML: 9 INJECTION, SOLUTION INTRAVENOUS at 09:09

## 2025-09-01 RX ADMIN — MAGNESIUM SULFATE HEPTAHYDRATE 2 G: 40 INJECTION, SOLUTION INTRAVENOUS at 10:09
